# Patient Record
Sex: FEMALE | Race: WHITE | HISPANIC OR LATINO | URBAN - METROPOLITAN AREA
[De-identification: names, ages, dates, MRNs, and addresses within clinical notes are randomized per-mention and may not be internally consistent; named-entity substitution may affect disease eponyms.]

---

## 2024-11-01 ENCOUNTER — INPATIENT (INPATIENT)
Facility: HOSPITAL | Age: 64
LOS: 4 days | Discharge: HOME | DRG: 65 | End: 2024-11-06
Attending: STUDENT IN AN ORGANIZED HEALTH CARE EDUCATION/TRAINING PROGRAM | Admitting: STUDENT IN AN ORGANIZED HEALTH CARE EDUCATION/TRAINING PROGRAM
Payer: MEDICARE

## 2024-11-01 ENCOUNTER — NON-APPOINTMENT (OUTPATIENT)
Age: 64
End: 2024-11-01

## 2024-11-01 ENCOUNTER — EMERGENCY (EMERGENCY)
Facility: HOSPITAL | Age: 64
LOS: 1 days | Discharge: ANOTHER HOSPITAL | End: 2024-11-01
Attending: EMERGENCY MEDICINE | Admitting: EMERGENCY MEDICINE
Payer: MEDICARE

## 2024-11-01 VITALS
RESPIRATION RATE: 20 BRPM | HEIGHT: 57 IN | SYSTOLIC BLOOD PRESSURE: 150 MMHG | OXYGEN SATURATION: 100 % | DIASTOLIC BLOOD PRESSURE: 75 MMHG | WEIGHT: 158.07 LBS | HEART RATE: 73 BPM | TEMPERATURE: 99 F

## 2024-11-01 VITALS
TEMPERATURE: 98 F | HEART RATE: 83 BPM | SYSTOLIC BLOOD PRESSURE: 104 MMHG | OXYGEN SATURATION: 98 % | DIASTOLIC BLOOD PRESSURE: 60 MMHG | RESPIRATION RATE: 20 BRPM

## 2024-11-01 VITALS
HEART RATE: 75 BPM | OXYGEN SATURATION: 100 % | SYSTOLIC BLOOD PRESSURE: 154 MMHG | DIASTOLIC BLOOD PRESSURE: 75 MMHG | RESPIRATION RATE: 20 BRPM

## 2024-11-01 DIAGNOSIS — I60.9 NONTRAUMATIC SUBARACHNOID HEMORRHAGE, UNSPECIFIED: ICD-10-CM

## 2024-11-01 LAB
ALBUMIN SERPL ELPH-MCNC: 4.1 G/DL — SIGNIFICANT CHANGE UP (ref 3.3–5)
ALBUMIN SERPL ELPH-MCNC: 4.3 G/DL — SIGNIFICANT CHANGE UP (ref 3.3–5.2)
ALP SERPL-CCNC: 93 U/L — SIGNIFICANT CHANGE UP (ref 40–120)
ALP SERPL-CCNC: 95 U/L — SIGNIFICANT CHANGE UP (ref 40–120)
ALT FLD-CCNC: 18 U/L — SIGNIFICANT CHANGE UP
ALT FLD-CCNC: 31 U/L — SIGNIFICANT CHANGE UP (ref 12–78)
ANION GAP SERPL CALC-SCNC: 10 MMOL/L — SIGNIFICANT CHANGE UP (ref 5–17)
ANION GAP SERPL CALC-SCNC: 14 MMOL/L — SIGNIFICANT CHANGE UP (ref 5–17)
APPEARANCE UR: CLEAR — SIGNIFICANT CHANGE UP
APTT BLD: 32.4 SEC — SIGNIFICANT CHANGE UP (ref 24.5–35.6)
APTT BLD: 33.5 SEC — SIGNIFICANT CHANGE UP (ref 24.5–35.6)
AST SERPL-CCNC: 21 U/L — SIGNIFICANT CHANGE UP
AST SERPL-CCNC: 25 U/L — SIGNIFICANT CHANGE UP (ref 15–37)
BASOPHILS # BLD AUTO: 0.02 K/UL — SIGNIFICANT CHANGE UP (ref 0–0.2)
BASOPHILS NFR BLD AUTO: 0.2 % — SIGNIFICANT CHANGE UP (ref 0–2)
BILIRUB SERPL-MCNC: 0.6 MG/DL — SIGNIFICANT CHANGE UP (ref 0.4–2)
BILIRUB SERPL-MCNC: 0.7 MG/DL — SIGNIFICANT CHANGE UP (ref 0.2–1.2)
BILIRUB UR-MCNC: NEGATIVE — SIGNIFICANT CHANGE UP
BLD GP AB SCN SERPL QL: SIGNIFICANT CHANGE UP
BUN SERPL-MCNC: 11 MG/DL — SIGNIFICANT CHANGE UP (ref 7–23)
BUN SERPL-MCNC: 8.3 MG/DL — SIGNIFICANT CHANGE UP (ref 8–20)
CALCIUM SERPL-MCNC: 9 MG/DL — SIGNIFICANT CHANGE UP (ref 8.4–10.5)
CALCIUM SERPL-MCNC: 9.1 MG/DL — SIGNIFICANT CHANGE UP (ref 8.5–10.1)
CHLORIDE SERPL-SCNC: 102 MMOL/L — SIGNIFICANT CHANGE UP (ref 96–108)
CHLORIDE SERPL-SCNC: 103 MMOL/L — SIGNIFICANT CHANGE UP (ref 96–108)
CO2 SERPL-SCNC: 21 MMOL/L — LOW (ref 22–29)
CO2 SERPL-SCNC: 23 MMOL/L — SIGNIFICANT CHANGE UP (ref 22–31)
COLOR SPEC: YELLOW — SIGNIFICANT CHANGE UP
CREAT SERPL-MCNC: 0.34 MG/DL — LOW (ref 0.5–1.3)
CREAT SERPL-MCNC: 0.5 MG/DL — SIGNIFICANT CHANGE UP (ref 0.5–1.3)
DIFF PNL FLD: NEGATIVE — SIGNIFICANT CHANGE UP
EGFR: 104 ML/MIN/1.73M2 — SIGNIFICANT CHANGE UP
EGFR: 114 ML/MIN/1.73M2 — SIGNIFICANT CHANGE UP
EOSINOPHIL # BLD AUTO: 0 K/UL — SIGNIFICANT CHANGE UP (ref 0–0.5)
EOSINOPHIL NFR BLD AUTO: 0 % — SIGNIFICANT CHANGE UP (ref 0–6)
FLUAV AG NPH QL: SIGNIFICANT CHANGE UP
FLUBV AG NPH QL: SIGNIFICANT CHANGE UP
GLUCOSE BLDC GLUCOMTR-MCNC: 131 MG/DL — HIGH (ref 70–99)
GLUCOSE SERPL-MCNC: 118 MG/DL — HIGH (ref 70–99)
GLUCOSE SERPL-MCNC: 136 MG/DL — HIGH (ref 70–99)
GLUCOSE UR QL: NEGATIVE MG/DL — SIGNIFICANT CHANGE UP
HCT VFR BLD CALC: 36.7 % — SIGNIFICANT CHANGE UP (ref 34.5–45)
HCT VFR BLD CALC: 37.6 % — SIGNIFICANT CHANGE UP (ref 34.5–45)
HGB BLD-MCNC: 12.5 G/DL — SIGNIFICANT CHANGE UP (ref 11.5–15.5)
HGB BLD-MCNC: 13.1 G/DL — SIGNIFICANT CHANGE UP (ref 11.5–15.5)
IMM GRANULOCYTES NFR BLD AUTO: 0.5 % — SIGNIFICANT CHANGE UP (ref 0–0.9)
INR BLD: 0.98 RATIO — SIGNIFICANT CHANGE UP (ref 0.85–1.16)
INR BLD: 0.98 RATIO — SIGNIFICANT CHANGE UP (ref 0.85–1.16)
KETONES UR-MCNC: NEGATIVE MG/DL — SIGNIFICANT CHANGE UP
LACTATE SERPL-SCNC: 1.7 MMOL/L — SIGNIFICANT CHANGE UP (ref 0.7–2)
LEUKOCYTE ESTERASE UR-ACNC: NEGATIVE — SIGNIFICANT CHANGE UP
LYMPHOCYTES # BLD AUTO: 2.12 K/UL — SIGNIFICANT CHANGE UP (ref 1–3.3)
LYMPHOCYTES # BLD AUTO: 26.3 % — SIGNIFICANT CHANGE UP (ref 13–44)
MAGNESIUM SERPL-MCNC: 2.5 MG/DL — SIGNIFICANT CHANGE UP (ref 1.8–2.6)
MCHC RBC-ENTMCNC: 29.4 PG — SIGNIFICANT CHANGE UP (ref 27–34)
MCHC RBC-ENTMCNC: 30 PG — SIGNIFICANT CHANGE UP (ref 27–34)
MCHC RBC-ENTMCNC: 34.1 G/DL — SIGNIFICANT CHANGE UP (ref 32–36)
MCHC RBC-ENTMCNC: 34.8 G/DL — SIGNIFICANT CHANGE UP (ref 32–36)
MCV RBC AUTO: 86.2 FL — SIGNIFICANT CHANGE UP (ref 80–100)
MCV RBC AUTO: 86.4 FL — SIGNIFICANT CHANGE UP (ref 80–100)
MONOCYTES # BLD AUTO: 0.24 K/UL — SIGNIFICANT CHANGE UP (ref 0–0.9)
MONOCYTES NFR BLD AUTO: 3 % — SIGNIFICANT CHANGE UP (ref 2–14)
NEUTROPHILS # BLD AUTO: 5.65 K/UL — SIGNIFICANT CHANGE UP (ref 1.8–7.4)
NEUTROPHILS NFR BLD AUTO: 70 % — SIGNIFICANT CHANGE UP (ref 43–77)
NITRITE UR-MCNC: NEGATIVE — SIGNIFICANT CHANGE UP
NRBC # BLD: 0 /100 WBCS — SIGNIFICANT CHANGE UP (ref 0–0)
PH UR: 7 — SIGNIFICANT CHANGE UP (ref 5–8)
PHOSPHATE SERPL-MCNC: 2.9 MG/DL — SIGNIFICANT CHANGE UP (ref 2.4–4.7)
PLATELET # BLD AUTO: 314 K/UL — SIGNIFICANT CHANGE UP (ref 150–400)
PLATELET # BLD AUTO: 329 K/UL — SIGNIFICANT CHANGE UP (ref 150–400)
POTASSIUM SERPL-MCNC: 3.4 MMOL/L — LOW (ref 3.5–5.3)
POTASSIUM SERPL-MCNC: 4.1 MMOL/L — SIGNIFICANT CHANGE UP (ref 3.5–5.3)
POTASSIUM SERPL-SCNC: 3.4 MMOL/L — LOW (ref 3.5–5.3)
POTASSIUM SERPL-SCNC: 4.1 MMOL/L — SIGNIFICANT CHANGE UP (ref 3.5–5.3)
PROT SERPL-MCNC: 8.1 G/DL — SIGNIFICANT CHANGE UP (ref 6.6–8.7)
PROT SERPL-MCNC: 8.8 G/DL — HIGH (ref 6–8.3)
PROT UR-MCNC: NEGATIVE MG/DL — SIGNIFICANT CHANGE UP
PROTHROM AB SERPL-ACNC: 11.4 SEC — SIGNIFICANT CHANGE UP (ref 9.9–13.4)
PROTHROM AB SERPL-ACNC: 11.5 SEC — SIGNIFICANT CHANGE UP (ref 9.9–13.4)
RBC # BLD: 4.25 M/UL — SIGNIFICANT CHANGE UP (ref 3.8–5.2)
RBC # BLD: 4.36 M/UL — SIGNIFICANT CHANGE UP (ref 3.8–5.2)
RBC # FLD: 12.5 % — SIGNIFICANT CHANGE UP (ref 10.3–14.5)
RBC # FLD: 12.5 % — SIGNIFICANT CHANGE UP (ref 10.3–14.5)
RSV RNA NPH QL NAA+NON-PROBE: SIGNIFICANT CHANGE UP
SARS-COV-2 RNA SPEC QL NAA+PROBE: SIGNIFICANT CHANGE UP
SODIUM SERPL-SCNC: 135 MMOL/L — SIGNIFICANT CHANGE UP (ref 135–145)
SODIUM SERPL-SCNC: 138 MMOL/L — SIGNIFICANT CHANGE UP (ref 135–145)
SP GR SPEC: 1.01 — SIGNIFICANT CHANGE UP (ref 1–1.03)
TROPONIN I, HIGH SENSITIVITY RESULT: 5.7 NG/L — SIGNIFICANT CHANGE UP
UROBILINOGEN FLD QL: 0.2 MG/DL — SIGNIFICANT CHANGE UP (ref 0.2–1)
WBC # BLD: 6.59 K/UL — SIGNIFICANT CHANGE UP (ref 3.8–10.5)
WBC # BLD: 8.07 K/UL — SIGNIFICANT CHANGE UP (ref 3.8–10.5)
WBC # FLD AUTO: 6.59 K/UL — SIGNIFICANT CHANGE UP (ref 3.8–10.5)
WBC # FLD AUTO: 8.07 K/UL — SIGNIFICANT CHANGE UP (ref 3.8–10.5)

## 2024-11-01 PROCEDURE — 70496 CT ANGIOGRAPHY HEAD: CPT | Mod: 26

## 2024-11-01 PROCEDURE — 99291 CRITICAL CARE FIRST HOUR: CPT | Mod: FS

## 2024-11-01 PROCEDURE — 71046 X-RAY EXAM CHEST 2 VIEWS: CPT | Mod: 26

## 2024-11-01 PROCEDURE — 74174 CTA ABD&PLVS W/CONTRAST: CPT | Mod: 26,MC

## 2024-11-01 PROCEDURE — 70470 CT HEAD/BRAIN W/O & W/DYE: CPT | Mod: 26,MC

## 2024-11-01 PROCEDURE — 93010 ELECTROCARDIOGRAM REPORT: CPT

## 2024-11-01 PROCEDURE — 70498 CT ANGIOGRAPHY NECK: CPT | Mod: 26

## 2024-11-01 PROCEDURE — 71275 CT ANGIOGRAPHY CHEST: CPT | Mod: 26,MC

## 2024-11-01 PROCEDURE — 99291 CRITICAL CARE FIRST HOUR: CPT

## 2024-11-01 PROCEDURE — 72125 CT NECK SPINE W/O DYE: CPT | Mod: 26,MC

## 2024-11-01 PROCEDURE — 99285 EMERGENCY DEPT VISIT HI MDM: CPT

## 2024-11-01 PROCEDURE — 93970 EXTREMITY STUDY: CPT | Mod: 26

## 2024-11-01 RX ORDER — SENNA 187 MG
2 TABLET ORAL AT BEDTIME
Refills: 0 | Status: DISCONTINUED | OUTPATIENT
Start: 2024-11-01 | End: 2024-11-06

## 2024-11-01 RX ORDER — LEVETIRACETAM 500 MG/1
500 TABLET, FILM COATED ORAL EVERY 12 HOURS
Refills: 0 | Status: DISCONTINUED | OUTPATIENT
Start: 2024-11-01 | End: 2024-11-02

## 2024-11-01 RX ORDER — HYDRALAZINE HYDROCHLORIDE 50 MG/1
10 TABLET, FILM COATED ORAL
Refills: 0 | Status: DISCONTINUED | OUTPATIENT
Start: 2024-11-01 | End: 2024-11-02

## 2024-11-01 RX ORDER — SODIUM CHLORIDE 9 MG/ML
2200 INJECTION, SOLUTION INTRAMUSCULAR; INTRAVENOUS; SUBCUTANEOUS ONCE
Refills: 0 | Status: COMPLETED | OUTPATIENT
Start: 2024-11-01 | End: 2024-11-01

## 2024-11-01 RX ORDER — LEVETIRACETAM 500 MG/1
500 TABLET, FILM COATED ORAL ONCE
Refills: 0 | Status: DISCONTINUED | OUTPATIENT
Start: 2024-11-01 | End: 2024-11-01

## 2024-11-01 RX ORDER — NIMODIPINE 30 MG/1
60 CAPSULE, LIQUID FILLED ORAL EVERY 4 HOURS
Refills: 0 | Status: DISCONTINUED | OUTPATIENT
Start: 2024-11-01 | End: 2024-11-06

## 2024-11-01 RX ORDER — LABETALOL HCL 200 MG
10 TABLET ORAL
Refills: 0 | Status: DISCONTINUED | OUTPATIENT
Start: 2024-11-01 | End: 2024-11-02

## 2024-11-01 RX ORDER — METOCLOPRAMIDE HCL 10 MG
10 TABLET ORAL ONCE
Refills: 0 | Status: COMPLETED | OUTPATIENT
Start: 2024-11-01 | End: 2024-11-01

## 2024-11-01 RX ORDER — DEXAMETHASONE 1.5 MG 1.5 MG/1
6 TABLET ORAL ONCE
Refills: 0 | Status: COMPLETED | OUTPATIENT
Start: 2024-11-01 | End: 2024-11-01

## 2024-11-01 RX ORDER — ACETAMINOPHEN 500 MG
1000 TABLET ORAL ONCE
Refills: 0 | Status: COMPLETED | OUTPATIENT
Start: 2024-11-01 | End: 2024-11-01

## 2024-11-01 RX ORDER — CHLORHEXIDINE GLUCONATE 40 MG/ML
1 SOLUTION TOPICAL
Refills: 0 | Status: DISCONTINUED | OUTPATIENT
Start: 2024-11-01 | End: 2024-11-06

## 2024-11-01 RX ORDER — INSULIN LISPRO 100/ML
VIAL (ML) SUBCUTANEOUS EVERY 6 HOURS
Refills: 0 | Status: DISCONTINUED | OUTPATIENT
Start: 2024-11-01 | End: 2024-11-02

## 2024-11-01 RX ORDER — NICARDIPINE HCL 30 MG
5 CAPSULE, EXTENDED RELEASE ORAL
Qty: 40 | Refills: 0 | Status: DISCONTINUED | OUTPATIENT
Start: 2024-11-01 | End: 2024-11-04

## 2024-11-01 RX ORDER — SODIUM CHLORIDE 9 MG/ML
1000 INJECTION, SOLUTION INTRAMUSCULAR; INTRAVENOUS; SUBCUTANEOUS
Refills: 0 | Status: DISCONTINUED | OUTPATIENT
Start: 2024-11-01 | End: 2024-11-02

## 2024-11-01 RX ORDER — POLYETHYLENE GLYCOL 3350 17 G/17G
17 POWDER, FOR SOLUTION ORAL DAILY
Refills: 0 | Status: DISCONTINUED | OUTPATIENT
Start: 2024-11-01 | End: 2024-11-06

## 2024-11-01 RX ADMIN — Medication 2 TABLET(S): at 22:00

## 2024-11-01 RX ADMIN — Medication 25 MG/HR: at 15:18

## 2024-11-01 RX ADMIN — Medication 400 MILLIGRAM(S): at 13:29

## 2024-11-01 RX ADMIN — LEVETIRACETAM 500 MILLIGRAM(S): 500 TABLET, FILM COATED ORAL at 19:54

## 2024-11-01 RX ADMIN — Medication 10 MILLIGRAM(S): at 13:30

## 2024-11-01 RX ADMIN — NIMODIPINE 60 MILLIGRAM(S): 30 CAPSULE, LIQUID FILLED ORAL at 22:01

## 2024-11-01 RX ADMIN — LEVETIRACETAM 500 MILLIGRAM(S): 500 TABLET, FILM COATED ORAL at 15:19

## 2024-11-01 RX ADMIN — DEXAMETHASONE 1.5 MG 6 MILLIGRAM(S): 1.5 TABLET ORAL at 13:29

## 2024-11-01 RX ADMIN — SODIUM CHLORIDE 2200 MILLILITER(S): 9 INJECTION, SOLUTION INTRAMUSCULAR; INTRAVENOUS; SUBCUTANEOUS at 13:29

## 2024-11-01 RX ADMIN — Medication 1000 MILLIGRAM(S): at 14:30

## 2024-11-01 RX ADMIN — NIMODIPINE 60 MILLIGRAM(S): 30 CAPSULE, LIQUID FILLED ORAL at 19:55

## 2024-11-01 RX ADMIN — SODIUM CHLORIDE 75 MILLILITER(S): 9 INJECTION, SOLUTION INTRAMUSCULAR; INTRAVENOUS; SUBCUTANEOUS at 16:48

## 2024-11-01 NOTE — ED PROVIDER NOTE - MUSCULOSKELETAL, MLM
Spine appears normal, range of motion is limited at the neck on turning causing pain in back of head and along paracervical spine musculature no bony pain no meningismus , no muscle or joint tenderness

## 2024-11-01 NOTE — ED PROVIDER NOTE - CLINICAL SUMMARY MEDICAL DECISION MAKING FREE TEXT BOX
Patient is a previously healthy 63-year-old female who presents emergency room with head pain nausea vomiting and abdominal discomfort.  Says the pain gets worse when she stood ends up.  Better when she sits down.  At this point she has no further abdominal discomfort which she also endorsed last night.  Plan of care includes rule out intracerebral hemorrhage rule out aortic dissection rule out herniated disc disease of the cervical spine, rule out ACS rule out infection of the abdomen and pelvis.  Will obtain laboratory studies including type and screen and coagulation profile, antiemetics IV fluids Tylenol for pain and steroids for the migraine headache.  EKG, troponin, urinalysis and COVID testing.  This patient has multiple symptoms for multiple organ systems.  Will try to exclude all the emergency is relevant to the confluence of complaints.

## 2024-11-01 NOTE — ED PROVIDER NOTE - CONSTITUTIONAL, MLM
normal... Well appearing, awake, alert, oriented to person, place, time/situation and in no apparent distress. speech is clear concise and pt able to speak lengthy sentences with  needing to stop her for a pause to translate. no

## 2024-11-01 NOTE — ED PROVIDER NOTE - OBJECTIVE STATEMENT
hx supplemented by  via language line and pt's daughter at bedside  Patient is a 63-year-old female who denies any significant medical surgical history.  She states that last night she began to have stomach pain and headaches.  She was giving her self and abdominal massage and this caused the headache pain to get worse.  She got up to go to the bathroom, became sweaty nauseous and vomited.  Every time she stood up the headaches got worse.  She had 5 episodes of emesis including at 5 AM this morning.  She has no hematemesis or hematochezia.  She has no diarrhea.  She has no recent trauma or travel.  She is not a smoker or drinker.  She has no history of headaches.  She has no past surgical history.  She has no ill contacts.  She has no primary care physician.  She has no drug allergies.  Patient is not a smoker.  No drug user.  She denies domestic violence.  She denies any urinary symptoms.  She currently has no abdominal pain.  States her pain at its worst was a 10 out of 10 when standing and walking, but now while seated is only at 1-2 out of 10 in quality.  The pain started at the top of her head, and when she applied pressure to the back of her neck the pain got worse.

## 2024-11-01 NOTE — H&P ADULT - NSHPLABSRESULTS_GEN_ALL_CORE
11-01    138  |  103  |  8.3  ----------------------------<  136[H]  3.4[L]   |  21.0[L]  |  0.34[L]    Ca    9.0      01 Nov 2024 16:40  Phos  2.9     11-01  Mg     2.5     11-01    TPro  8.1  /  Alb  4.3  /  TBili  0.6  /  DBili  x   /  AST  21  /  ALT  18  /  AlkPhos  95  11-01        ACC: [id number] EXAM: CT BRAIN WAW IC ORDERED BY: SLOANE MONTOYA  PROCEDURE DATE: 11/01/2024     IMPRESSION:  CT head:  -Acute subarachnoid hemorrhage in the prepontine cistern and extending into the left perimesencephalic and ambient cisterns. Recommend CTA to evaluate for aneurysm or vascular malformation. Recommend neurointerventional evaluation.

## 2024-11-01 NOTE — CONSULT NOTE ADULT - PROBLEM SELECTOR RECOMMENDATION 9
case d/w Dr. Pereyra and images reviewed   no immediate NSx intervention indicated at this time  CTA H/N ASAP   neuro-checks q1 HR and NSICU admit  NPO w IVF until CTA reviewed   HOB > 30 degrees  hold all AC/AP/ ASA or chemical DVT PPx  OOB w assist/ fall precaution   SBP <160mmHg if nonaneurysmal heme per CTA if aneurysmal heme SBP<140mmHg or as per NSICU   PT/OT evals in AM

## 2024-11-01 NOTE — ED PROVIDER NOTE - NIH STROKE SCALE: 5B. MOTOR ARM, RIGHT, QM
Per Dr. See & MIKE Davis, pts labs have already significantly improved; f/u plan is for pt to see PCP in 3 mos w/ CBC. Pt does not need to f/u w/ our office at this time, unless something changes.    Call to Trish, mother, reviewed above & plan, verified PCP is Dr. Stauffer, advised I would update Dr. Stauffer's staff w/ plan. Ruma is in agreement. Encouraged them to call Dr. Stauffer's Office to schedule & she agreed. Encouraged them to update our staff w/ changes, questions, or concerns & she agreed.    Faxed update to Dr. Stauffer's Office at [phone].  
(0) No drift; limb holds 90 (or 45) degrees for full 10 secs

## 2024-11-01 NOTE — ED ADULT NURSE NOTE - CHIEF COMPLAINT QUOTE
pt transfer from [place], pt brought to ED in [place] due to nausea and vomiting starting last night. pt found to have subarachnoid hemorrahge. pt on Nicardipine drip

## 2024-11-01 NOTE — ED PROVIDER NOTE - PROGRESS NOTE DETAILS
Results reviewed with daughter.  She is aware the tests are negative.  Patient in CAT scan at this time.  She is doing well. radiologist called via teams to notify me pt has brainstem bleeding needs angiography and neurosurgery  will arrange for transfer and consult dw pt and dtr with   aware of brain bleed need for transfer   aware of need for transfer  dr taylor keep sbp under  140 keppra nicardipine to keep sbp below 140
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
## 2024-11-01 NOTE — ED PROVIDER NOTE - CRITICAL CARE ATTENDING CONTRIBUTION TO CARE
Patient was critically ill with a high probability of imminent or life threatening deterioration.  I have performed direct patient care (not related to procedure), additional history taking, interpretation of diagnostic studies, documentation, consultation with other physicians, telephone consultation with the patient's family.   I have personally and independently provided the amount of critical care time documented below excluding time spent on separate procedures.  critical care saida:30 mins

## 2024-11-01 NOTE — H&P ADULT - HISTORY OF PRESENT ILLNESS
63 year old female with no past medical history who presents with headache, nausea, and vomiting which began last night. Patient states that her symptoms began with abdominal pain for which she performed abdominal massage and she began experiencing strong headaches along with nausea and vomiting shortly after. Reports that she took over the counter Magnesium for her symptoms and went to bed. This morning, patient states that she continued to experience headache which worsened with standing and walking. Denies any current nausea, vomiting, or abdominal pain. Denies any episodes of vomiting today. Denies any changes to vision, difficulties speaking, or difficulty with moving the extremities. Notes that her headache improves with laying in bed.

## 2024-11-01 NOTE — H&P ADULT - NSHPPHYSICALEXAM_GEN_ALL_CORE
GENERAL: NAD, well-groomed  HEAD:  Atraumatic, normocephalic  EYES: Conjunctiva and sclera clear  HERIBERTO COMA SCORE: E-4 V-5 M-6 = 15  MENTAL STATUS: AAO x3; Awake, opens eyes spontaneously. Appropriately conversant without aphasia. Following simple commands.  CRANIAL NERVES: Visual acuity normal for age, visual fields full to confrontation, PERRL. EOMI without nystagmus. Left facial droop noted. Tongue midline. Hearing grossly intact. Speech clear.    REFLEXES: PERRL.   MOTOR: strength 5/5 b/l upper and lower extremities  Uppers     Delt (C5/6)     Bicep (C5/6)     Tricep (C7)     HG (C8/T1)  R                     5/5                 5/5                         5/5                           5/5                    L                      5/5                 5/5                         5/5                           5/5                    Lowers     HF(L1/L2)   KE (L3)     DF (L4)     PF (S1)      R                     5/5              5/5           5/5           5/5            L                     5/5               5/5          5/5            5/5            SENSATION: grossly intact to light touch all extremities  COORDINATION: No upper extremity dysmetria  CHEST/LUNG: No labored breathing or accessory muscle use    HEART: Sinus rhythm  ABDOMEN: Soft, nondistended;  LYMPH: No lymphadenopathy noted  SKIN: Warm, dry

## 2024-11-01 NOTE — ED ADULT TRIAGE NOTE - NS ED TRIAGE AVPU SCALE
Alert-The patient is alert, awake and responds to voice. The patient is oriented to time, place, and person. The triage nurse is able to obtain subjective information.
21

## 2024-11-01 NOTE — ED ADULT NURSE REASSESSMENT NOTE - NS ED NURSE REASSESS COMMENT FT1
received patient at CT scan, patient in no distress, minimal headache, tolerable, awaiting return to room for meds and updated vitals LS clear, respirations even and nonlabored,

## 2024-11-01 NOTE — ED ADULT TRIAGE NOTE - CHIEF COMPLAINT QUOTE
pt was wheeled into triage C/O nausea and vomiting with headache that started last night. denies past medical hx. BEFAST negative.

## 2024-11-01 NOTE — ED ADULT NURSE NOTE - OBJECTIVE STATEMENT
Pt presents to the ED c/o headache. Reports n/v and abdominal pain yesterday, relieved at this time. Denies fevers/chills. No neuro deficit observed, no facial asymmetry, equal  strength bilaterally, no arm/leg drift, positive strength to all four extremities, and positive sensation. IV established in left arm with a 20G, labs drawn and sent, call bell in reach, warm blanket provided, bed in lowest position, side rails up x2, MD evaluation in progress. Pt is well appearing, in no acute distress at this time.

## 2024-11-01 NOTE — ED PROVIDER NOTE - NIH STROKE SCALE: 9. BEST LANGUAGE, QM
(0) No aphasia; normal Mirvaso Counseling: Mirvaso is a topical medication which can decrease superficial blood flow where applied. Side effects are uncommon and include stinging, redness and allergic reactions.

## 2024-11-01 NOTE — ED ADULT TRIAGE NOTE - CHIEF COMPLAINT QUOTE
pt transfer from [place], pt brought to ED in [place] due to nausea and vomiting starting last night. pt found to have subarachnoid hemorrahge. pt on Nicardipine drip

## 2024-11-01 NOTE — H&P ADULT - NSHPSOCIALHISTORY_GEN_ALL_CORE
Patient states that she lives between [place] and [place] during the year. 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 Patient states that she does not follow with primary care doctors and does not desire to take medication at home and prefers supplements and natural treatments. Patient is .

## 2024-11-01 NOTE — H&P ADULT - ASSESSMENT
63 year old female with no past medical history who presents with headache, nausea, and vomiting. CT head reveals acute subarachnoid hemorrhage in the prepontine cistern.    Plan    Neuro  Admit to Neuro ICU  Q1 neuro checks   Repeat CT head  CTA  Keppra 500mg IV push every 12 hours  Nimodipine 60mg every 4 hours  Neurosurgical plan pending imaging.    CV  SBP goal less than 140  PRN Labetalol  PRN Hydralazine   Follow lipid panel with AM labs    Respiratory  RA    GI  NPO  Bowel Regimen: Senna and Miralax      Sodium chloride 0.9% at 75 mL/hr  Monitor input and output, euvolemic goal    Heme  DVT prophylaxis SCDs  Hold chemoprophylaxis secondary to SAH    Endo  Follow A1c and TSH with morning labs
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
## 2024-11-02 ENCOUNTER — APPOINTMENT (OUTPATIENT)
Dept: NEUROLOGY | Facility: HOSPITAL | Age: 64
End: 2024-11-02

## 2024-11-02 ENCOUNTER — RESULT REVIEW (OUTPATIENT)
Age: 64
End: 2024-11-02

## 2024-11-02 LAB
A1C WITH ESTIMATED AVERAGE GLUCOSE RESULT: 5.7 % — HIGH (ref 4–5.6)
ANION GAP SERPL CALC-SCNC: 13 MMOL/L — SIGNIFICANT CHANGE UP (ref 5–17)
BLD GP AB SCN SERPL QL: SIGNIFICANT CHANGE UP
BUN SERPL-MCNC: 12.1 MG/DL — SIGNIFICANT CHANGE UP (ref 8–20)
CALCIUM SERPL-MCNC: 8.4 MG/DL — SIGNIFICANT CHANGE UP (ref 8.4–10.5)
CHLORIDE SERPL-SCNC: 106 MMOL/L — SIGNIFICANT CHANGE UP (ref 96–108)
CHOLEST SERPL-MCNC: 198 MG/DL — SIGNIFICANT CHANGE UP
CO2 SERPL-SCNC: 19 MMOL/L — LOW (ref 22–29)
CREAT SERPL-MCNC: 0.26 MG/DL — LOW (ref 0.5–1.3)
EGFR: 122 ML/MIN/1.73M2 — SIGNIFICANT CHANGE UP
ESTIMATED AVERAGE GLUCOSE: 117 MG/DL — HIGH (ref 68–114)
GLUCOSE BLDC GLUCOMTR-MCNC: 117 MG/DL — HIGH (ref 70–99)
GLUCOSE BLDC GLUCOMTR-MCNC: 84 MG/DL — SIGNIFICANT CHANGE UP (ref 70–99)
GLUCOSE SERPL-MCNC: 110 MG/DL — HIGH (ref 70–99)
HCT VFR BLD CALC: 32.1 % — LOW (ref 34.5–45)
HDLC SERPL-MCNC: 50 MG/DL — LOW
HGB BLD-MCNC: 11.2 G/DL — LOW (ref 11.5–15.5)
LIPID PNL WITH DIRECT LDL SERPL: 130 MG/DL — HIGH
MAGNESIUM SERPL-MCNC: 2.2 MG/DL — SIGNIFICANT CHANGE UP (ref 1.6–2.6)
MCHC RBC-ENTMCNC: 30 PG — SIGNIFICANT CHANGE UP (ref 27–34)
MCHC RBC-ENTMCNC: 34.9 G/DL — SIGNIFICANT CHANGE UP (ref 32–36)
MCV RBC AUTO: 86.1 FL — SIGNIFICANT CHANGE UP (ref 80–100)
MRSA PCR RESULT.: SIGNIFICANT CHANGE UP
NON HDL CHOLESTEROL: 148 MG/DL — HIGH
PHOSPHATE SERPL-MCNC: 3.7 MG/DL — SIGNIFICANT CHANGE UP (ref 2.4–4.7)
PLATELET # BLD AUTO: 275 K/UL — SIGNIFICANT CHANGE UP (ref 150–400)
POTASSIUM SERPL-MCNC: 3.4 MMOL/L — LOW (ref 3.5–5.3)
POTASSIUM SERPL-SCNC: 3.4 MMOL/L — LOW (ref 3.5–5.3)
RBC # BLD: 3.73 M/UL — LOW (ref 3.8–5.2)
RBC # FLD: 12.6 % — SIGNIFICANT CHANGE UP (ref 10.3–14.5)
S AUREUS DNA NOSE QL NAA+PROBE: SIGNIFICANT CHANGE UP
SODIUM SERPL-SCNC: 138 MMOL/L — SIGNIFICANT CHANGE UP (ref 135–145)
TRIGL SERPL-MCNC: 89 MG/DL — SIGNIFICANT CHANGE UP
TSH SERPL-MCNC: 0.18 UIU/ML — LOW (ref 0.27–4.2)
WBC # BLD: 8.03 K/UL — SIGNIFICANT CHANGE UP (ref 3.8–10.5)
WBC # FLD AUTO: 8.03 K/UL — SIGNIFICANT CHANGE UP (ref 3.8–10.5)

## 2024-11-02 PROCEDURE — 99232 SBSQ HOSP IP/OBS MODERATE 35: CPT

## 2024-11-02 PROCEDURE — 76377 3D RENDER W/INTRP POSTPROCES: CPT | Mod: 26

## 2024-11-02 PROCEDURE — 36227 PLACE CATH XTRNL CAROTID: CPT | Mod: 50

## 2024-11-02 PROCEDURE — 36224 PLACE CATH CAROTD ART: CPT | Mod: 50

## 2024-11-02 PROCEDURE — 93306 TTE W/DOPPLER COMPLETE: CPT | Mod: 26

## 2024-11-02 PROCEDURE — 36226 PLACE CATH VERTEBRAL ART: CPT | Mod: 50

## 2024-11-02 PROCEDURE — 99291 CRITICAL CARE FIRST HOUR: CPT

## 2024-11-02 RX ORDER — HYDRALAZINE HYDROCHLORIDE 50 MG/1
10 TABLET, FILM COATED ORAL
Refills: 0 | Status: DISCONTINUED | OUTPATIENT
Start: 2024-11-02 | End: 2024-11-06

## 2024-11-02 RX ORDER — LABETALOL HCL 200 MG
10 TABLET ORAL
Refills: 0 | Status: DISCONTINUED | OUTPATIENT
Start: 2024-11-02 | End: 2024-11-06

## 2024-11-02 RX ORDER — CYCLOBENZAPRINE HYDROCHLORIDE 30 MG/1
5 CAPSULE, EXTENDED RELEASE ORAL THREE TIMES A DAY
Refills: 0 | Status: DISCONTINUED | OUTPATIENT
Start: 2024-11-02 | End: 2024-11-02

## 2024-11-02 RX ORDER — MAGNESIUM SULFATE IN 0.9% NACL 2 G/50 ML
2 INTRAVENOUS SOLUTION, PIGGYBACK (ML) INTRAVENOUS ONCE
Refills: 0 | Status: COMPLETED | OUTPATIENT
Start: 2024-11-02 | End: 2024-11-02

## 2024-11-02 RX ORDER — SODIUM CHLORIDE 9 MG/ML
250 INJECTION, SOLUTION INTRAMUSCULAR; INTRAVENOUS; SUBCUTANEOUS ONCE
Refills: 0 | Status: COMPLETED | OUTPATIENT
Start: 2024-11-02 | End: 2024-11-02

## 2024-11-02 RX ORDER — POTASSIUM CHLORIDE 10 MEQ
10 TABLET, EXTENDED RELEASE ORAL
Refills: 0 | Status: COMPLETED | OUTPATIENT
Start: 2024-11-02 | End: 2024-11-02

## 2024-11-02 RX ORDER — SODIUM CHLORIDE 9 MG/ML
500 INJECTION, SOLUTION INTRAMUSCULAR; INTRAVENOUS; SUBCUTANEOUS ONCE
Refills: 0 | Status: COMPLETED | OUTPATIENT
Start: 2024-11-02 | End: 2024-11-02

## 2024-11-02 RX ORDER — ENOXAPARIN SODIUM 40MG/0.4ML
40 SYRINGE (ML) SUBCUTANEOUS
Refills: 0 | Status: DISCONTINUED | OUTPATIENT
Start: 2024-11-02 | End: 2024-11-06

## 2024-11-02 RX ORDER — ACETAMINOPHEN 500 MG
1000 TABLET ORAL ONCE
Refills: 0 | Status: COMPLETED | OUTPATIENT
Start: 2024-11-02 | End: 2024-11-02

## 2024-11-02 RX ORDER — CYCLOBENZAPRINE HYDROCHLORIDE 30 MG/1
5 CAPSULE, EXTENDED RELEASE ORAL THREE TIMES A DAY
Refills: 0 | Status: DISCONTINUED | OUTPATIENT
Start: 2024-11-02 | End: 2024-11-06

## 2024-11-02 RX ADMIN — NIMODIPINE 60 MILLIGRAM(S): 30 CAPSULE, LIQUID FILLED ORAL at 22:06

## 2024-11-02 RX ADMIN — SODIUM CHLORIDE 250 MILLILITER(S): 9 INJECTION, SOLUTION INTRAMUSCULAR; INTRAVENOUS; SUBCUTANEOUS at 22:06

## 2024-11-02 RX ADMIN — NIMODIPINE 60 MILLIGRAM(S): 30 CAPSULE, LIQUID FILLED ORAL at 14:58

## 2024-11-02 RX ADMIN — HYDRALAZINE HYDROCHLORIDE 10 MILLIGRAM(S): 50 TABLET, FILM COATED ORAL at 07:40

## 2024-11-02 RX ADMIN — CYCLOBENZAPRINE HYDROCHLORIDE 5 MILLIGRAM(S): 30 CAPSULE, EXTENDED RELEASE ORAL at 22:07

## 2024-11-02 RX ADMIN — LEVETIRACETAM 500 MILLIGRAM(S): 500 TABLET, FILM COATED ORAL at 05:01

## 2024-11-02 RX ADMIN — NIMODIPINE 60 MILLIGRAM(S): 30 CAPSULE, LIQUID FILLED ORAL at 05:01

## 2024-11-02 RX ADMIN — SODIUM CHLORIDE 1000 MILLILITER(S): 9 INJECTION, SOLUTION INTRAMUSCULAR; INTRAVENOUS; SUBCUTANEOUS at 18:46

## 2024-11-02 RX ADMIN — NIMODIPINE 60 MILLIGRAM(S): 30 CAPSULE, LIQUID FILLED ORAL at 18:21

## 2024-11-02 RX ADMIN — NIMODIPINE 60 MILLIGRAM(S): 30 CAPSULE, LIQUID FILLED ORAL at 01:20

## 2024-11-02 RX ADMIN — SODIUM CHLORIDE 750 MILLILITER(S): 9 INJECTION, SOLUTION INTRAMUSCULAR; INTRAVENOUS; SUBCUTANEOUS at 02:22

## 2024-11-02 RX ADMIN — Medication 40 MILLIGRAM(S): at 22:08

## 2024-11-02 RX ADMIN — Medication 100 MILLIEQUIVALENT(S): at 07:31

## 2024-11-02 RX ADMIN — Medication 100 MILLIEQUIVALENT(S): at 05:30

## 2024-11-02 RX ADMIN — CHLORHEXIDINE GLUCONATE 1 APPLICATION(S): 40 SOLUTION TOPICAL at 05:01

## 2024-11-02 RX ADMIN — Medication 400 MILLIGRAM(S): at 16:23

## 2024-11-02 RX ADMIN — Medication 100 MILLIEQUIVALENT(S): at 06:22

## 2024-11-02 RX ADMIN — Medication 150 GRAM(S): at 18:48

## 2024-11-02 RX ADMIN — Medication 1000 MILLIGRAM(S): at 17:00

## 2024-11-02 RX ADMIN — Medication 20 MILLIGRAM(S): at 22:07

## 2024-11-02 RX ADMIN — Medication 2 TABLET(S): at 22:07

## 2024-11-02 NOTE — CHART NOTE - NSCHARTNOTEFT_GEN_A_CORE
Neurointerventional Surgery Post Procedure Note    Procedure: Selective Cerebral Angiography     Pre- Procedure Diagnosis: perimesencephalic SAH   Post- Procedure Diagnosis: normal angiogram    : Dr. Say Shahid MD  Physician Assistant: Tanja Adler  Nurse: Mike Burnett  Anesthesiologist: RAJIV Navas                   Radiology Tech: Pardeep Garrido  Fellow: Martin Amador    Sheath:  4 Citizen of [place] Sheath    I/Os: estimated blood loss less than 10cc,  IV fluids 200cc, Urine output 0cc, Contrast: Omnipaque 240  120 cc    Vitals:  /66  HR 80  Spo2 100 %    Preliminary Report:  Under a 4 Citizen of [place] short sheath via the right groin under MAC sedation via left vertebral artery, left internal carotid artery, left external carotid artery, right vertebral artery, right internal carotid artery, right external carotid artery, a selective cerebral angiography  was performed and reveals normal angiogram ( Official note to follow).    Patient tolerated procedure well.  Patient remains hemodynamically stable, no change in neurological status compared to baseline.  Results were discussed with patient, patient's family and Neurosurgery.  Right groin sheath  was discontinued at 0952. Hemostasis was obtained with approximately 13 minutes of manual compression.     No active bleeding, no hematoma, no ecchymosis.   Quick clot and safeguard balloon dressing applied at 1005  Patient transferred to neuro ICU in stable condition. Neurointerventional Surgery Post Procedure Note    Procedure: Selective Cerebral Angiography     Pre- Procedure Diagnosis: perimesencephalic SAH   Post- Procedure Diagnosis: normal angiogram    : Dr. Say Shahid MD  Physician Assistant: Tanja Adler  Nurse: Mike Burnett  Anesthesiologist: RAJIV Navas                   Radiology Tech: Pardeep Garrido  Fellow: Martin Amador    Sheath:  4 [place] Sheath    I/Os: estimated blood loss less than 10cc,  IV fluids 200cc, Urine output 0cc, Contrast: Omnipaque 240  120 cc    Vitals:  /66  HR 80  Spo2 100 %    Preliminary Report:  Under a 4 [place] short sheath via the right groin under MAC sedation via left vertebral artery, left internal carotid artery, left external carotid artery, right vertebral artery, right internal carotid artery, right external carotid artery, a selective cerebral angiography  was performed and reveals normal angiogram ( Official note to follow).    Patient tolerated procedure well.  Patient remains hemodynamically stable, no change in neurological status compared to baseline.  Results were discussed with patient, patient's family and Neurosurgery.  Right groin sheath  was discontinued at 0952. Hemostasis was obtained with approximately 8 minutes of manual compression.     No active bleeding, no hematoma, no ecchymosis.   Quick clot and safeguard balloon dressing applied at 1000.  Patient transferred to neuro ICU in stable condition.

## 2024-11-02 NOTE — PROGRESS NOTE ADULT - ASSESSMENT
63y old RHD Female w abrupt onset of abdomina pain/ headache/ nausea/ vomiting/ dizziness/ neck pain and weakness/ dizziness when upright at ~7PM yesterday after using a pulsating neck massager.  Pt presented to ED at [facility] and CTB was obtained and significant for acute subarachnoid hemorrhage in the prepontine cistern and extending into the left perimesencephalic and ambient cisterns & pt was transferred to [facility] for further eval/ management.     Plan:   no immediate NSx intervention indicated at this time  CTA H/N performed and negative, pt with stable CTH  neuro-checks q1 HR   NPO as pt will go for angiogram in the morning. Case discussed with Dr. Shahid  HOB > 30 degrees  hold all AC/AP/ ASA or chemical DVT PPx  OOB w assist/ fall precaution   SBP <140mmHg until angio performed   PT/OT    Case discussed with Dr. Pereyra

## 2024-11-02 NOTE — CONSULT NOTE ADULT - CONSULT REASON
SAH
tx from [facility] for CT Brain reported prepontine SAH, pt denied trauma

## 2024-11-02 NOTE — PROGRESS NOTE ADULT - ASSESSMENT
63 year old female with no past medical history who presents with headache, nausea, and vomiting. CT head reveals acute subarachnoid hemorrhage in the prepontine cistern.  HLD     Plan  Neuro  Q2 neuro checks   diagnostic angio this morning  dc Keppra   Nimodipine 60mg every 4 hours  goal euvolemia, nimodipine    CV  SBP goal less than 140  PRN Labetalol  PRN Hydralazine   start statin    Respiratory  RA    GI  adv diet as tolerated  Bowel Regimen: Senna and Miralax      Sodium chloride 0.9% at 75 mL/hr while NPO  Monitor input and output, euvolemic goal  voiding    Heme  DVT prophylaxis SCDs  start lovenox    Endo  5.7 A1c
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
## 2024-11-02 NOTE — PROGRESS NOTE ADULT - SUBJECTIVE AND OBJECTIVE BOX
HPI:  63 year old female with no past medical history who presents with headache, nausea, and vomiting which began last night. Patient states that her symptoms began with abdominal pain for which she performed abdominal massage and she began experiencing strong headaches along with nausea and vomiting shortly after. Reports that she took over the counter Magnesium for her symptoms and went to bed. This morning, patient states that she continued to experience headache which worsened with standing and walking. Denies any current nausea, vomiting, or abdominal pain. Denies any episodes of vomiting today. Denies any changes to vision, difficulties speaking, or difficulty with moving the extremities. Notes that her headache improves with laying in bed.  (01 Nov 2024 17:21)      INTERVAL HPI/OVERNIGHT EVENTS:  Patient seen during evening rounds resting comfortably in bed. Pt had no complaints at time of exam. Pt is NPO for angio today with Dr. Shahid. Repeat CT imaging showing stability of SAH and no vascular abnormality seen on CTA however radiology still recommending angio     Vital Signs Last 24 Hrs  T(C): 36.3 (01 Nov 2024 23:00), Max: 36.7 (01 Nov 2024 16:12)  T(F): 97.3 (01 Nov 2024 23:00), Max: 98 (01 Nov 2024 16:12)  HR: 69 (01 Nov 2024 23:45) (62 - 86)  BP: 123/78 (01 Nov 2024 23:45) (91/50 - 141/66)  BP(mean): 91 (01 Nov 2024 23:45) (60 - 92)  RR: 18 (01 Nov 2024 23:45) (10 - 23)  SpO2: 97% (01 Nov 2024 23:45) (95% - 100%)    Parameters below as of 01 Nov 2024 18:01  Patient On (Oxygen Delivery Method): room air    PHYSICAL EXAM:  GENERAL: well-groomed, well-developed  HEAD: Atraumatic, Normocephalic, no palpable step-off appreciated on palpation  EYES: b/l EOMI, PERRL, conjunctiva and sclera clear, peripheral visions grossly intact b/l.  NECK: Supple, nontender to palpation   TS/LS: nontender to palpation midline or paraspinal muscles b/l,   NERVOUS SYSTEM: Alert & Oriented X3, speech is clear and fluent, no dysarthria appreciated. Good concentration & very cooperative;   Motor Strength 5/5 B/L upper and lower extremities, sensory is at baseline and symmetric b/l;   No pronators or ankle clonus appreciated b/l, cerebellar signs grossly intact b/l      LABS:                        12.5   6.59  )-----------( 314      ( 01 Nov 2024 16:40 )             36.7     11-01    138  |  103  |  8.3  ----------------------------<  136[H]  3.4[L]   |  21.0[L]  |  0.34[L]    Ca    9.0      01 Nov 2024 16:40  Phos  2.9     11-01  Mg     2.5     11-01    TPro  8.1  /  Alb  4.3  /  TBili  0.6  /  DBili  x   /  AST  21  /  ALT  18  /  AlkPhos  95  11-01    PT/INR - ( 01 Nov 2024 16:40 )   PT: 11.4 sec;   INR: 0.98 ratio         PTT - ( 01 Nov 2024 16:40 )  PTT:32.4 sec  Urinalysis Basic - ( 01 Nov 2024 16:40 )    Color: x / Appearance: x / SG: x / pH: x  Gluc: 136 mg/dL / Ketone: x  / Bili: x / Urobili: x   Blood: x / Protein: x / Nitrite: x   Leuk Esterase: x / RBC: x / WBC x   Sq Epi: x / Non Sq Epi: x / Bacteria: x        11-01 @ 07:01  -  11-02 @ 00:59  --------------------------------------------------------  IN: 425 mL / OUT: 250 mL / NET: 175 mL        RADIOLOGY & ADDITIONAL TESTS:  < from: CT Angio Head w/ IV Cont (11.01.24 @ 19:59) >  IMPRESSION:    1.  Stable perimesencephalic hemorrhage, without evidence of an   underlying vascular lesion.  2.  Catheter angiography and neurosurgical consultation recommended.    < end of copied text >    < from: CT Angio Head w/ IV Cont (11.01.24 @ 19:59) >  There is fetal origin of the right PCA. There is calcified plaque at both   carotid siphons without significant associated narrowing.    < end of copied text >

## 2024-11-02 NOTE — CHART NOTE - NSCHARTNOTEFT_GEN_A_CORE
Neurointerventional Surgery  Pre-Procedure Note        HPI:  63 year old female with no past medical history who presents with headache, nausea, and vomiting which began last night. Patient states that her symptoms began with abdominal pain for which she performed abdominal massage and she began experiencing strong headaches along with nausea and vomiting shortly after. Reports that she took over the counter Magnesium for her symptoms and went to bed. This morning, patient states that she continued to experience headache which worsened with standing and walking. Denies any current nausea, vomiting, or abdominal pain. Denies any episodes of vomiting today. Denies any changes to vision, difficulties speaking, or difficulty with moving the extremities. Notes that her headache improves with laying in bed.  (01 Nov 2024 17:21)      Allergies: Allergy Status Unknown      PAST MEDICAL & SURGICAL HISTORY:  No pertinent past medical history      Social History:  Patient states that she lives between [place] and [place] during the year. 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 Patient states that she does not follow with primary care doctors and does not desire to take medication at home and prefers supplements and natural treatments. Patient is . (01 Nov 2024 17:21)      Current Medications:   chlorhexidine 2% Cloths 1 Application(s) Topical <User Schedule>  dextrose 50% Injectable 25 Gram(s) IV Push once  dextrose 50% Injectable 12.5 Gram(s) IV Push once  dextrose 50% Injectable 25 Gram(s) IV Push once  hydrALAZINE Injectable 10 milliGRAM(s) IV Push every 2 hours PRN  insulin lispro (ADMELOG) corrective regimen sliding scale   SubCutaneous every 6 hours  labetalol Injectable 10 milliGRAM(s) IV Push every 2 hours PRN  levETIRAcetam   Injectable 500 milliGRAM(s) IV Push every 12 hours  niMODipine 60 milliGRAM(s) Oral every 4 hours  polyethylene glycol 3350 17 Gram(s) Oral daily  senna 2 Tablet(s) Oral at bedtime      Physical Exam:  Constitutional: NAD, lying in bed  Neuro  * Mental Status:  GCS 15: Awake, alert, oriented to conversation. No aphasia or difficulty speaking. No dysarthria. Able to name objects and their function.  * Cranial Nerves: Cnii-Cnxii grossly intact. PERRL, EOMI, tongue midline, no gaze deviation  * Motor: RUE 5/5, LUE 5/5, RLE 5/5, LLE 5/5, no drift or dysmetria  * Sensory: Sensation intact to light touch  * Reflexes: not assessed   Cardiovascular: Regular rate and rhythm.  Eyes: See neurologic examination with detailed examination of eyes.  ENT: No JVD, Trachea Midline  Respiratory: non labored breathing   Gastrointestinal: Soft, nontender, nondistended.  Genitourinary: [ ] Zuniga, [ x ] No Zuniga.   Musculoskeletal: No muscle wasting noted, (See neurologic assessment for full muscle strength assessment)   Skin:  no wounds, no redness, no abrasions noted  Hematologic / Lymph / Immunologic: No bleeding from IV sites or wounds      [facility] SS:  DATE: 11/2/24   TIME: 0715  1A: Level of consciousness (0-3): 0  1B: Questions (0-2): 0    1C: Commands (0-2): 0  2: Gaze (0-2): 0  3: Visual fields (0-3): 0  4: Facial palsy (0-3): 0  MOTOR:  5A: Left arm motor drift (0-4): 0  5B: Right arm motor drift (0-4): 0  6A: Left leg motor drift (0-4): 0  6B: Right leg motor drift (0-4): 0  7: Limb ataxia (0-2): 0  SENSORY:  8: Sensation (0-2): 0  SPEECH:  9: Language (0-3): 0  10: Dysarthria (0-2): 0  EXTINCTION:  11: Extinction/inattention (0-2): 0    TOTAL SCORE: 0       Labs:                         11.2   8.03  )-----------( 275      ( 02 Nov 2024 03:30 )             32.1       11-02    138  |  106  |  12.1  ----------------------------<  110[H]  3.4[L]   |  19.0[L]  |  0.26[L]    Ca    8.4      02 Nov 2024 03:30  Phos  3.7     11-02  Mg     2.2     11-02    TPro  8.1  /  Alb  4.3  /  TBili  0.6  /  DBili  x   /  AST  21  /  ALT  18  /  AlkPhos  95  11-01    PT/INR - ( 01 Nov 2024 16:40 )   PT: 11.4 sec;   INR: 0.98 ratio    PTT - ( 01 Nov 2024 16:40 )  PTT:32.4 sec      Assessment/Plan:   This is a 63y  year old Female  presents with perimesencephalic SAH. Patient presents to neuro-IR for selective cerebral angiography.     Procedure, goals, risks, benefits and alternatives  were discussed with patient.  All questions were answered.  Risks include but are not limited to stroke, vessel injury, hemorrhage, and or groin hematoma.  Patient demonstrates understanding  of all risks involved with this procedure and wishes to continue.   Appropriate  consent was obtained from patient and consent is in the patient's chart.

## 2024-11-02 NOTE — PATIENT PROFILE ADULT - FALL HARM RISK - HARM RISK INTERVENTIONS

## 2024-11-02 NOTE — PROGRESS NOTE ADULT - SUBJECTIVE AND OBJECTIVE BOX
Chief complaint:   Patient is a 63y old  Female who presents with a chief complaint of Prepontine Subarachnoid aneurysm (02 Nov 2024 00:59)    HPI:  63 year old female with no past medical history who presents with headache, nausea, and vomiting which began last night. Patient states that her symptoms began with abdominal pain for which she performed abdominal massage and she began experiencing strong headaches along with nausea and vomiting shortly after. Reports that she took over the counter Magnesium for her symptoms and went to bed. This morning, patient states that she continued to experience headache which worsened with standing and walking. Denies any current nausea, vomiting, or abdominal pain. Denies any episodes of vomiting today. Denies any changes to vision, difficulties speaking, or difficulty with moving the extremities. Notes that her headache improves with laying in bed.  (01 Nov 2024 17:21)        24hr EVENTS:      ROS: [ ]  Unable to assess due to mental status   All other systems negative    -----------------------------------------------------------------------------------------------------------------------------------------------------------------------------------  ICU Vital Signs Last 24 Hrs  T(C): 36.6 (02 Nov 2024 07:42), Max: 36.7 (01 Nov 2024 16:12)  T(F): 97.8 (02 Nov 2024 07:42), Max: 98 (01 Nov 2024 16:12)  HR: 74 (02 Nov 2024 07:30) (52 - 86)  BP: 136/69 (02 Nov 2024 07:00) (81/50 - 141/66)  BP(mean): 89 (02 Nov 2024 07:00) (59 - 92)  ABP: --  ABP(mean): --  RR: 17 (02 Nov 2024 07:30) (10 - 23)  SpO2: 100% (02 Nov 2024 07:30) (95% - 100%)    O2 Parameters below as of 02 Nov 2024 07:00  Patient On (Oxygen Delivery Method): room air            I&O's Summary    01 Nov 2024 07:01  -  02 Nov 2024 07:00  --------------------------------------------------------  IN: 1350 mL / OUT: 450 mL / NET: 900 mL        MEDICATIONS  (STANDING):  chlorhexidine 2% Cloths 1 Application(s) Topical <User Schedule>  dextrose 50% Injectable 25 Gram(s) IV Push once  dextrose 50% Injectable 12.5 Gram(s) IV Push once  dextrose 50% Injectable 25 Gram(s) IV Push once  insulin lispro (ADMELOG) corrective regimen sliding scale   SubCutaneous every 6 hours  levETIRAcetam   Injectable 500 milliGRAM(s) IV Push every 12 hours  niMODipine 60 milliGRAM(s) Oral every 4 hours  polyethylene glycol 3350 17 Gram(s) Oral daily  senna 2 Tablet(s) Oral at bedtime    IMAGING:   Recent imaging studies were reviewed.    LAB RESULTS:                          11.2   8.03  )-----------( 275      ( 02 Nov 2024 03:30 )             32.1       PT/INR - ( 01 Nov 2024 16:40 )   PT: 11.4 sec;   INR: 0.98 ratio         PTT - ( 01 Nov 2024 16:40 )  PTT:32.4 sec    11-02    138  |  106  |  12.1  ----------------------------<  110[H]  3.4[L]   |  19.0[L]  |  0.26[L]    Ca    8.4      02 Nov 2024 03:30  Phos  3.7     11-02  Mg     2.2     11-02    TPro  8.1  /  Alb  4.3  /  TBili  0.6  /  DBili  x   /  AST  21  /  ALT  18  /  AlkPhos  95  11-01    -----------------------------------------------------------------------------------------------------------------------------------------------------------------------------------    PHYSICAL EXAM:  General: Calm, laying in bed  HEENT: MMM  Neuro:  -Mental status- No acute distress, AOx3, conversational, following commands  -CN- PERRL 3mm, EOMI, tongue midline, face symmetric  -Motor- full strength in all ext  -Sensation- intact to LT   -Coordination- no dysmetria noted    CV: RRR  Pulm: Clear to auscultation  Abd: Soft, nontender, nondistended  Ext: No edema  Skin: warm, dry

## 2024-11-02 NOTE — CONSULT NOTE ADULT - ASSESSMENT
63y old RHD Female w abrupt onset of abdomina pain/ headache/ nausea/ vomiting/ dizziness/ neck pain and weakness/ dizziness when upright at ~7PM yesterday after using a pulsating neck massager.  Pt presented to ED at [facility] and CTB was obtained and significant for acute subarachnoid hemorrhage in the prepontine cistern and extending into the left perimesencephalic and ambient cisterns & pt was transferred to [facility] for further eval/ management.   pt seen in ED and is noted to have a mild left facial droop, o/w intact/ at baseline 
63-year-old Urdu speaking lady with no significant PMH presented with HA; n/v started yesterday night. HA got worse during the day, exacerbated by standing and walking. CTH demonstrated perimesencephalic SAH. No evidence of the aneurysm on CTA h/n.     - SBP <140  - NC q1h  - Start nimodipine   - NPO for DSA tomorrow       Elvis Amador MD

## 2024-11-03 LAB
ANION GAP SERPL CALC-SCNC: 10 MMOL/L — SIGNIFICANT CHANGE UP (ref 5–17)
BUN SERPL-MCNC: 9 MG/DL — SIGNIFICANT CHANGE UP (ref 8–20)
CALCIUM SERPL-MCNC: 7.6 MG/DL — LOW (ref 8.4–10.5)
CHLORIDE SERPL-SCNC: 109 MMOL/L — HIGH (ref 96–108)
CO2 SERPL-SCNC: 20 MMOL/L — LOW (ref 22–29)
CREAT SERPL-MCNC: 0.28 MG/DL — LOW (ref 0.5–1.3)
EGFR: 120 ML/MIN/1.73M2 — SIGNIFICANT CHANGE UP
GLUCOSE SERPL-MCNC: 95 MG/DL — SIGNIFICANT CHANGE UP (ref 70–99)
HCT VFR BLD CALC: 31.8 % — LOW (ref 34.5–45)
HGB BLD-MCNC: 11.1 G/DL — LOW (ref 11.5–15.5)
MAGNESIUM SERPL-MCNC: 2.4 MG/DL — SIGNIFICANT CHANGE UP (ref 1.6–2.6)
MCHC RBC-ENTMCNC: 30.3 PG — SIGNIFICANT CHANGE UP (ref 27–34)
MCHC RBC-ENTMCNC: 34.9 G/DL — SIGNIFICANT CHANGE UP (ref 32–36)
MCV RBC AUTO: 86.9 FL — SIGNIFICANT CHANGE UP (ref 80–100)
PHOSPHATE SERPL-MCNC: 2.8 MG/DL — SIGNIFICANT CHANGE UP (ref 2.4–4.7)
PLATELET # BLD AUTO: 264 K/UL — SIGNIFICANT CHANGE UP (ref 150–400)
POTASSIUM SERPL-MCNC: 3.1 MMOL/L — LOW (ref 3.5–5.3)
POTASSIUM SERPL-SCNC: 3.1 MMOL/L — LOW (ref 3.5–5.3)
RBC # BLD: 3.66 M/UL — LOW (ref 3.8–5.2)
RBC # FLD: 12.8 % — SIGNIFICANT CHANGE UP (ref 10.3–14.5)
SODIUM SERPL-SCNC: 139 MMOL/L — SIGNIFICANT CHANGE UP (ref 135–145)
WBC # BLD: 6.59 K/UL — SIGNIFICANT CHANGE UP (ref 3.8–10.5)
WBC # FLD AUTO: 6.59 K/UL — SIGNIFICANT CHANGE UP (ref 3.8–10.5)

## 2024-11-03 PROCEDURE — 99232 SBSQ HOSP IP/OBS MODERATE 35: CPT

## 2024-11-03 PROCEDURE — 99291 CRITICAL CARE FIRST HOUR: CPT

## 2024-11-03 RX ORDER — ACETAMINOPHEN 500 MG
1000 TABLET ORAL ONCE
Refills: 0 | Status: COMPLETED | OUTPATIENT
Start: 2024-11-03 | End: 2024-11-03

## 2024-11-03 RX ORDER — POTASSIUM CHLORIDE 10 MEQ
40 TABLET, EXTENDED RELEASE ORAL EVERY 4 HOURS
Refills: 0 | Status: COMPLETED | OUTPATIENT
Start: 2024-11-03 | End: 2024-11-03

## 2024-11-03 RX ORDER — ACETAMINOPHEN 500 MG
650 TABLET ORAL EVERY 6 HOURS
Refills: 0 | Status: DISCONTINUED | OUTPATIENT
Start: 2024-11-03 | End: 2024-11-06

## 2024-11-03 RX ORDER — POTASSIUM CHLORIDE 10 MEQ
40 TABLET, EXTENDED RELEASE ORAL EVERY 4 HOURS
Refills: 0 | Status: DISCONTINUED | OUTPATIENT
Start: 2024-11-03 | End: 2024-11-03

## 2024-11-03 RX ADMIN — Medication 40 MILLIEQUIVALENT(S): at 05:03

## 2024-11-03 RX ADMIN — NIMODIPINE 60 MILLIGRAM(S): 30 CAPSULE, LIQUID FILLED ORAL at 01:47

## 2024-11-03 RX ADMIN — CYCLOBENZAPRINE HYDROCHLORIDE 5 MILLIGRAM(S): 30 CAPSULE, EXTENDED RELEASE ORAL at 14:38

## 2024-11-03 RX ADMIN — Medication 20 MILLIGRAM(S): at 22:05

## 2024-11-03 RX ADMIN — Medication 2 TABLET(S): at 22:09

## 2024-11-03 RX ADMIN — CYCLOBENZAPRINE HYDROCHLORIDE 5 MILLIGRAM(S): 30 CAPSULE, EXTENDED RELEASE ORAL at 22:06

## 2024-11-03 RX ADMIN — NIMODIPINE 60 MILLIGRAM(S): 30 CAPSULE, LIQUID FILLED ORAL at 05:03

## 2024-11-03 RX ADMIN — Medication 500 MILLILITER(S): at 06:55

## 2024-11-03 RX ADMIN — NIMODIPINE 60 MILLIGRAM(S): 30 CAPSULE, LIQUID FILLED ORAL at 22:06

## 2024-11-03 RX ADMIN — Medication 650 MILLIGRAM(S): at 18:05

## 2024-11-03 RX ADMIN — Medication 40 MILLIEQUIVALENT(S): at 05:02

## 2024-11-03 RX ADMIN — NIMODIPINE 60 MILLIGRAM(S): 30 CAPSULE, LIQUID FILLED ORAL at 14:38

## 2024-11-03 RX ADMIN — POLYETHYLENE GLYCOL 3350 17 GRAM(S): 17 POWDER, FOR SOLUTION ORAL at 12:34

## 2024-11-03 RX ADMIN — CYCLOBENZAPRINE HYDROCHLORIDE 5 MILLIGRAM(S): 30 CAPSULE, EXTENDED RELEASE ORAL at 05:03

## 2024-11-03 RX ADMIN — Medication 5 MILLIGRAM(S): at 18:02

## 2024-11-03 RX ADMIN — Medication 650 MILLIGRAM(S): at 18:35

## 2024-11-03 RX ADMIN — CHLORHEXIDINE GLUCONATE 1 APPLICATION(S): 40 SOLUTION TOPICAL at 05:02

## 2024-11-03 RX ADMIN — NIMODIPINE 60 MILLIGRAM(S): 30 CAPSULE, LIQUID FILLED ORAL at 10:24

## 2024-11-03 RX ADMIN — Medication 40 MILLIGRAM(S): at 22:10

## 2024-11-03 NOTE — PROGRESS NOTE ADULT - ASSESSMENT
63 year old female with no past medical history who presents with headache, nausea, and vomiting. CT head reveals acute subarachnoid hemorrhage in the prepontine cistern.  HLD     Plan  Neuro  Q2 neuro checks   diagnostic angio- unremarkable  Nimodipine 60mg every 4 hours  goal euvolemia  flexeril, prn tylenol    CV  SBP goal less than 140  PRN Labetalol  PRN Hydralazine   statin    Respiratory  RA  OOB    GI  adv diet as tolerated  Bowel Regimen: Senna and Miralax      Monitor input and output, euvolemic goal  voiding    Heme  DVT prophylaxis SCDs  lovenox    Endo  5.7 A1c

## 2024-11-03 NOTE — PROGRESS NOTE ADULT - SUBJECTIVE AND OBJECTIVE BOX
Chief complaint:   Patient is a 63y old  Female who presents with a chief complaint of Prepontine Subarachnoid aneurysm (03 Nov 2024 00:07)    HPI:  63 year old female with no past medical history who presents with headache, nausea, and vomiting which began last night. Patient states that her symptoms began with abdominal pain for which she performed abdominal massage and she began experiencing strong headaches along with nausea and vomiting shortly after. Reports that she took over the counter Magnesium for her symptoms and went to bed. This morning, patient states that she continued to experience headache which worsened with standing and walking. Denies any current nausea, vomiting, or abdominal pain. Denies any episodes of vomiting today. Denies any changes to vision, difficulties speaking, or difficulty with moving the extremities. Notes that her headache improves with laying in bed.  (01 Nov 2024 17:21)        24hr EVENTS: IV fluids for euvolemia      ROS: mild neck pain  All other systems negative    -----------------------------------------------------------------------------------------------------------------------------------------------------------------------------------  ICU Vital Signs Last 24 Hrs  T(C): 36.8 (03 Nov 2024 07:23), Max: 37 (02 Nov 2024 20:00)  T(F): 98.2 (03 Nov 2024 07:23), Max: 98.6 (02 Nov 2024 20:00)  HR: 75 (03 Nov 2024 08:00) (52 - 75)  BP: 149/69 (03 Nov 2024 08:00) (82/40 - 149/69)  BP(mean): 92 (03 Nov 2024 08:00) (54 - 109)  ABP: --  ABP(mean): --  RR: 16 (03 Nov 2024 08:00) (10 - 24)  SpO2: 100% (03 Nov 2024 08:00) (93% - 100%)    O2 Parameters below as of 03 Nov 2024 08:00  Patient On (Oxygen Delivery Method): room air            I&O's Summary    02 Nov 2024 08:01  -  03 Nov 2024 07:00  --------------------------------------------------------  IN: 1800 mL / OUT: 2400 mL / NET: -600 mL        MEDICATIONS  (STANDING):  atorvastatin 20 milliGRAM(s) Oral at bedtime  chlorhexidine 2% Cloths 1 Application(s) Topical <User Schedule>  cyclobenzaprine 5 milliGRAM(s) Oral three times a day  enoxaparin Injectable 40 milliGRAM(s) SubCutaneous <User Schedule>  niMODipine 60 milliGRAM(s) Oral every 4 hours  polyethylene glycol 3350 17 Gram(s) Oral daily  senna 2 Tablet(s) Oral at bedtime      RESPIRATORY:        IMAGING:   Recent imaging studies were reviewed.    LAB RESULTS:                          11.1   6.59  )-----------( 264      ( 03 Nov 2024 02:32 )             31.8       PT/INR - ( 01 Nov 2024 16:40 )   PT: 11.4 sec;   INR: 0.98 ratio         PTT - ( 01 Nov 2024 16:40 )  PTT:32.4 sec    11-03    139  |  109[H]  |  9.0  ----------------------------<  95  3.1[L]   |  20.0[L]  |  0.28[L]    Ca    7.6[L]      03 Nov 2024 02:32  Phos  2.8     11-03  Mg     2.4     11-03    TPro  8.1  /  Alb  4.3  /  TBili  0.6  /  DBili  x   /  AST  21  /  ALT  18  /  AlkPhos  95  11-01      -----------------------------------------------------------------------------------------------------------------------------------------------------------------------------------    PHYSICAL EXAM: [place] speaking  General: Calm, laying in bed  HEENT: MMM  Neuro:  -Mental status- No acute distress, AOx3, conversational, following commands  -CN- PERRL 3mm, EOMI, tongue midline, face symmetric  -Motor- full strength in all ext  -Sensation- intact to LT   -Coordination- no dysmetria noted    CV: RRR  Pulm: Clear to auscultation  Abd: Soft, nontender, nondistended  Ext: No edema  Skin: warm, dry

## 2024-11-03 NOTE — PROGRESS NOTE ADULT - ASSESSMENT
· Assessment	    63y old RHD Female w abrupt onset of abdomina pain/ headache/ nausea/ vomiting/ dizziness/ neck pain and weakness/ dizziness when upright at ~7PM yesterday after using a pulsating neck massager.  Pt presented to ED at [facility] and CTB was obtained and significant for acute subarachnoid hemorrhage in the prepontine cistern and extending into the left perimesencephalic and ambient cisterns & pt was transferred to [facility] for further eval/ management.     Plan:   no immediate NSx intervention indicated at this time  CTA H/N performed and negative, pt with stable CTH. Angio negative for vascular lesion  neuro-checks q2 HR   HOB > 30 degrees  Nimodipine, statin  Enox ppx  OOB w assist/ fall precaution   SBP <160 as angio was negative  Maintain euvolemia   PT/OT    Case to be discussed with Dr. Pereyra in the morning

## 2024-11-03 NOTE — OCCUPATIONAL THERAPY INITIAL EVALUATION ADULT - FINE MOTOR COORDINATION, LEFT HAND, MANIPULATION OF OBJECTS, OT EVAL
Please call patient with the following info:    Folate is normal so no signs of a deficiency.  I would like patient to follow up with hematology to discuss possible causes of her anemia - referral placed   normal performance

## 2024-11-03 NOTE — OCCUPATIONAL THERAPY INITIAL EVALUATION ADULT - PERTINENT HX OF CURRENT PROBLEM, REHAB EVAL
As per MD note: 63 year old female with no past medical history who presents with headache, nausea, and vomiting which began last night. Patient states that her symptoms began with abdominal pain for which she performed abdominal massage and she began experiencing strong headaches along with nausea and vomiting shortly after. Reports that she took over the counter Magnesium for her symptoms and went to bed. This morning, patient states that she continued to experience headache which worsened with standing and walking. Denies any current nausea, vomiting, or abdominal pain. Denies any episodes of vomiting today. Denies any changes to vision, difficulties speaking, or difficulty with moving the extremities. Notes that her headache improves with laying in bed.
General

## 2024-11-03 NOTE — PROGRESS NOTE ADULT - SUBJECTIVE AND OBJECTIVE BOX
HPI:  63 year old female with no past medical history who presents with headache, nausea, and vomiting which began last night. Patient states that her symptoms began with abdominal pain for which she performed abdominal massage and she began experiencing strong headaches along with nausea and vomiting shortly after. Reports that she took over the counter Magnesium for her symptoms and went to bed. This morning, patient states that she continued to experience headache which worsened with standing and walking. Denies any current nausea, vomiting, or abdominal pain. Denies any episodes of vomiting today. Denies any changes to vision, difficulties speaking, or difficulty with moving the extremities. Notes that her headache improves with laying in bed.  (01 Nov 2024 17:21)      INTERVAL HPI/OVERNIGHT EVENTS:  Patient seen resting comfortably in bed, pleasant mood. No complaints at time of my exam. Pt had angio yesterday which was negative. Voiding spontaneously.    Vital Signs Last 24 Hrs  T(C): 36.6 (02 Nov 2024 23:33), Max: 37 (02 Nov 2024 20:00)  T(F): 97.9 (02 Nov 2024 23:33), Max: 98.6 (02 Nov 2024 20:00)  HR: 55 (03 Nov 2024 00:00) (52 - 74)  BP: 102/56 (03 Nov 2024 00:00) (81/50 - 140/69)  BP(mean): 72 (03 Nov 2024 00:00) (54 - 109)  RR: 18 (03 Nov 2024 00:00) (10 - 24)  SpO2: 94% (03 Nov 2024 00:00) (94% - 100%)    Parameters below as of 02 Nov 2024 23:25  Patient On (Oxygen Delivery Method): room air    PHYSICAL EXAM:  General: Calm, laying in bed  HEENT: MMM  Neuro:  -Mental status- No acute distress, AOx3, conversational, following commands  -CN- PERRL 3mm, EOMI, tongue midline, face symmetric  -Motor- full strength in all ext  -Sensation- intact to LT   -Coordination- no dysmetria noted  CV: RRR  Pulm: Clear to auscultation  Abd: Soft, nontender, nondistended  Ext: No edema  Skin: warm, dry    LABS:                        11.2   8.03  )-----------( 275      ( 02 Nov 2024 03:30 )             32.1     11-02    138  |  106  |  12.1  ----------------------------<  110[H]  3.4[L]   |  19.0[L]  |  0.26[L]    Ca    8.4      02 Nov 2024 03:30  Phos  3.7     11-02  Mg     2.2     11-02    TPro  8.1  /  Alb  4.3  /  TBili  0.6  /  DBili  x   /  AST  21  /  ALT  18  /  AlkPhos  95  11-01    PT/INR - ( 01 Nov 2024 16:40 )   PT: 11.4 sec;   INR: 0.98 ratio         PTT - ( 01 Nov 2024 16:40 )  PTT:32.4 sec  Urinalysis Basic - ( 02 Nov 2024 03:30 )    Color: x / Appearance: x / SG: x / pH: x  Gluc: 110 mg/dL / Ketone: x  / Bili: x / Urobili: x   Blood: x / Protein: x / Nitrite: x   Leuk Esterase: x / RBC: x / WBC x   Sq Epi: x / Non Sq Epi: x / Bacteria: x        11-01 @ 07:01  -  11-02 @ 07:00  --------------------------------------------------------  IN: 1350 mL / OUT: 450 mL / NET: 900 mL    11-02 @ 08:01  -  11-03 @ 00:07  --------------------------------------------------------  IN: 800 mL / OUT: 1400 mL / NET: -600 mL        RADIOLOGY & ADDITIONAL TESTS:  < from: CT Angio Head w/ IV Cont (11.01.24 @ 19:59) >  IMPRESSION:    1.  Stable perimesencephalic hemorrhage, without evidence of an   underlying vascular lesion.  2.  Catheter angiography and neurosurgical consultation recommended.    < end of copied text >

## 2024-11-04 ENCOUNTER — TRANSCRIPTION ENCOUNTER (OUTPATIENT)
Age: 64
End: 2024-11-04

## 2024-11-04 LAB
ANION GAP SERPL CALC-SCNC: 13 MMOL/L — SIGNIFICANT CHANGE UP (ref 5–17)
BUN SERPL-MCNC: 11.1 MG/DL — SIGNIFICANT CHANGE UP (ref 8–20)
CALCIUM SERPL-MCNC: 8.5 MG/DL — SIGNIFICANT CHANGE UP (ref 8.4–10.5)
CHLORIDE SERPL-SCNC: 104 MMOL/L — SIGNIFICANT CHANGE UP (ref 96–108)
CO2 SERPL-SCNC: 22 MMOL/L — SIGNIFICANT CHANGE UP (ref 22–29)
CREAT SERPL-MCNC: 0.31 MG/DL — LOW (ref 0.5–1.3)
EGFR: 117 ML/MIN/1.73M2 — SIGNIFICANT CHANGE UP
GLUCOSE SERPL-MCNC: 101 MG/DL — HIGH (ref 70–99)
HCT VFR BLD CALC: 34.1 % — LOW (ref 34.5–45)
HGB BLD-MCNC: 11.9 G/DL — SIGNIFICANT CHANGE UP (ref 11.5–15.5)
MAGNESIUM SERPL-MCNC: 2 MG/DL — SIGNIFICANT CHANGE UP (ref 1.6–2.6)
MCHC RBC-ENTMCNC: 30.3 PG — SIGNIFICANT CHANGE UP (ref 27–34)
MCHC RBC-ENTMCNC: 34.9 G/DL — SIGNIFICANT CHANGE UP (ref 32–36)
MCV RBC AUTO: 86.8 FL — SIGNIFICANT CHANGE UP (ref 80–100)
PHOSPHATE SERPL-MCNC: 3.4 MG/DL — SIGNIFICANT CHANGE UP (ref 2.4–4.7)
PLATELET # BLD AUTO: 274 K/UL — SIGNIFICANT CHANGE UP (ref 150–400)
POTASSIUM SERPL-MCNC: 3.4 MMOL/L — LOW (ref 3.5–5.3)
POTASSIUM SERPL-SCNC: 3.4 MMOL/L — LOW (ref 3.5–5.3)
RBC # BLD: 3.93 M/UL — SIGNIFICANT CHANGE UP (ref 3.8–5.2)
RBC # FLD: 12.5 % — SIGNIFICANT CHANGE UP (ref 10.3–14.5)
SODIUM SERPL-SCNC: 139 MMOL/L — SIGNIFICANT CHANGE UP (ref 135–145)
WBC # BLD: 5.53 K/UL — SIGNIFICANT CHANGE UP (ref 3.8–10.5)
WBC # FLD AUTO: 5.53 K/UL — SIGNIFICANT CHANGE UP (ref 3.8–10.5)

## 2024-11-04 PROCEDURE — 99291 CRITICAL CARE FIRST HOUR: CPT

## 2024-11-04 PROCEDURE — 99232 SBSQ HOSP IP/OBS MODERATE 35: CPT

## 2024-11-04 RX ORDER — POTASSIUM CHLORIDE 10 MEQ
40 TABLET, EXTENDED RELEASE ORAL ONCE
Refills: 0 | Status: COMPLETED | OUTPATIENT
Start: 2024-11-04 | End: 2024-11-04

## 2024-11-04 RX ADMIN — CYCLOBENZAPRINE HYDROCHLORIDE 5 MILLIGRAM(S): 30 CAPSULE, EXTENDED RELEASE ORAL at 13:20

## 2024-11-04 RX ADMIN — CYCLOBENZAPRINE HYDROCHLORIDE 5 MILLIGRAM(S): 30 CAPSULE, EXTENDED RELEASE ORAL at 05:27

## 2024-11-04 RX ADMIN — NIMODIPINE 60 MILLIGRAM(S): 30 CAPSULE, LIQUID FILLED ORAL at 13:20

## 2024-11-04 RX ADMIN — Medication 1000 MILLIGRAM(S): at 00:37

## 2024-11-04 RX ADMIN — Medication 5 MILLIGRAM(S): at 05:25

## 2024-11-04 RX ADMIN — CYCLOBENZAPRINE HYDROCHLORIDE 5 MILLIGRAM(S): 30 CAPSULE, EXTENDED RELEASE ORAL at 21:07

## 2024-11-04 RX ADMIN — NIMODIPINE 60 MILLIGRAM(S): 30 CAPSULE, LIQUID FILLED ORAL at 17:29

## 2024-11-04 RX ADMIN — NIMODIPINE 60 MILLIGRAM(S): 30 CAPSULE, LIQUID FILLED ORAL at 10:05

## 2024-11-04 RX ADMIN — Medication 2 TABLET(S): at 21:07

## 2024-11-04 RX ADMIN — NIMODIPINE 60 MILLIGRAM(S): 30 CAPSULE, LIQUID FILLED ORAL at 05:25

## 2024-11-04 RX ADMIN — CHLORHEXIDINE GLUCONATE 1 APPLICATION(S): 40 SOLUTION TOPICAL at 05:28

## 2024-11-04 RX ADMIN — Medication 20 MILLIGRAM(S): at 21:07

## 2024-11-04 RX ADMIN — Medication 40 MILLIEQUIVALENT(S): at 05:25

## 2024-11-04 RX ADMIN — NIMODIPINE 60 MILLIGRAM(S): 30 CAPSULE, LIQUID FILLED ORAL at 21:08

## 2024-11-04 RX ADMIN — Medication 400 MILLIGRAM(S): at 00:07

## 2024-11-04 RX ADMIN — Medication 40 MILLIGRAM(S): at 21:08

## 2024-11-04 NOTE — PROGRESS NOTE ADULT - ASSESSMENT
63y old RHD Female w abrupt onset of abdomina pain/ headache/ nausea/ vomiting/ dizziness/ neck pain and weakness/ dizziness when upright at ~7PM yesterday after using a pulsating neck massager.  Pt presented to ED at [facility] and CTB was obtained and significant for acute subarachnoid hemorrhage in the prepontine cistern and extending into the left perimesencephalic and ambient cisterns & pt was transferred to [facility] for further eval/ management.     Plan:   no immediate NSx intervention indicated at this time  CTA H/N performed and negative, pt with stable CTH. Angio negative for vascular lesion  neuro-checks q2 HR   HOB > 30 degrees  Nimodipine, statin  Enox ppx  OOB w assist/ fall precaution   SBP <160 as angio was negative  Maintain euvolemia   PT/OT  SQ lovenox and compression device for DVT ppx    Case to be discussed with Dr. Pereyra in the morning

## 2024-11-04 NOTE — DISCHARGE NOTE NURSING/CASE MANAGEMENT/SOCIAL WORK - PATIENT PORTAL LINK FT
You can access the FollowMyHealth Patient Portal offered by [facility] by registering at the following website: [email or web address]. By joining [facility]’s FollowMyHealth portal, you will also be able to view your health information using other applications (apps) compatible with our system.

## 2024-11-04 NOTE — PROGRESS NOTE ADULT - SUBJECTIVE AND OBJECTIVE BOX
HPI:  63 year old female with no past medical history who presents with headache, nausea, and vomiting which began last night. Patient states that her symptoms began with abdominal pain for which she performed abdominal massage and she began experiencing strong headaches along with nausea and vomiting shortly after. Reports that she took over the counter Magnesium for her symptoms and went to bed. This morning, patient states that she continued to experience headache which worsened with standing and walking. Denies any current nausea, vomiting, or abdominal pain. Denies any episodes of vomiting today. Denies any changes to vision, difficulties speaking, or difficulty with moving the extremities. Notes that her headache improves with laying in bed.  (01 Nov 2024 17:21)      INTERVAL HPI/OVERNIGHT EVENTS:  Patient seen during rounds, resting comfortably in bed. Pt is voiding spontaneously, neuro exam remains intact    Vital Signs Last 24 Hrs  T(C): 36.6 (03 Nov 2024 23:38), Max: 37.2 (03 Nov 2024 15:54)  T(F): 97.9 (03 Nov 2024 23:38), Max: 99 (03 Nov 2024 15:54)  HR: 55 (04 Nov 2024 01:00) (52 - 81)  BP: 111/53 (04 Nov 2024 01:00) (91/54 - 149/69)  BP(mean): 71 (04 Nov 2024 01:00) (64 - 127)  RR: 18 (04 Nov 2024 01:00) (12 - 22)  SpO2: 98% (04 Nov 2024 01:00) (95% - 100%)    Parameters below as of 04 Nov 2024 00:00  Patient On (Oxygen Delivery Method): room air    PHYSICAL EXAM: [place] speaking  General: Calm, laying in bed  HEENT: MMM  Neuro:  -Mental status- No acute distress, AOx3, conversational, following commands  -CN- PERRL 3mm, EOMI, tongue midline, face symmetric  -Motor- full strength in all ext  -Sensation- intact to LT   -Coordination- no dysmetria noted  CV: RRR  Pulm: nonlabored breathing, no accessory use  Abd: Soft, nontender, nondistended  Ext: No edema  Skin: warm,     LABS:                        11.1   6.59  )-----------( 264      ( 03 Nov 2024 02:32 )             31.8     11-03    139  |  109[H]  |  9.0  ----------------------------<  95  3.1[L]   |  20.0[L]  |  0.28[L]    Ca    7.6[L]      03 Nov 2024 02:32  Phos  2.8     11-03  Mg     2.4     11-03        Urinalysis Basic - ( 03 Nov 2024 02:32 )    Color: x / Appearance: x / SG: x / pH: x  Gluc: 95 mg/dL / Ketone: x  / Bili: x / Urobili: x   Blood: x / Protein: x / Nitrite: x   Leuk Esterase: x / RBC: x / WBC x   Sq Epi: x / Non Sq Epi: x / Bacteria: x        11-02 @ 08:01  -  11-03 @ 07:00  --------------------------------------------------------  IN: 1800 mL / OUT: 2400 mL / NET: -600 mL    11-03 @ 07:01  -  11-04 @ 01:17  --------------------------------------------------------  IN: 800 mL / OUT: 1100 mL / NET: -300 mL        RADIOLOGY & ADDITIONAL TESTS:  < from: CT Angio Head w/ IV Cont (11.01.24 @ 19:59) >  IMPRESSION:    1.  Stable perimesencephalic hemorrhage, without evidence of an   underlying vascular lesion.  2.  Catheter angiography and neurosurgical consultation recommended.    < end of copied text >

## 2024-11-04 NOTE — PROGRESS NOTE ADULT - SUBJECTIVE AND OBJECTIVE BOX
Chief complaint:   Patient is a 63y old  Female who presents with a chief complaint of Prepontine Subarachnoid aneurysm (04 Nov 2024 01:16)    HPI:  63 year old female with no past medical history who presents with headache, nausea, and vomiting which began last night. Patient states that her symptoms began with abdominal pain for which she performed abdominal massage and she began experiencing strong headaches along with nausea and vomiting shortly after. Reports that she took over the counter Magnesium for her symptoms and went to bed. This morning, patient states that she continued to experience headache which worsened with standing and walking. Denies any current nausea, vomiting, or abdominal pain. Denies any episodes of vomiting today. Denies any changes to vision, difficulties speaking, or difficulty with moving the extremities. Notes that her headache improves with laying in bed.  (01 Nov 2024 17:21)        24hr EVENTS: no issues      ROS: [ ] neck pain with movement  All other systems negative    -----------------------------------------------------------------------------------------------------------------------------------------------------------------------------------  ICU Vital Signs Last 24 Hrs  T(C): 36.9 (04 Nov 2024 04:02), Max: 37.2 (03 Nov 2024 15:54)  T(F): 98.4 (04 Nov 2024 04:02), Max: 99 (03 Nov 2024 15:54)  HR: 64 (04 Nov 2024 08:00) (54 - 81)  BP: 135/65 (04 Nov 2024 08:00) (91/54 - 142/118)  BP(mean): 87 (04 Nov 2024 08:00) (64 - 127)  ABP: --  ABP(mean): --  RR: 17 (04 Nov 2024 08:00) (13 - 22)  SpO2: 98% (04 Nov 2024 08:00) (96% - 100%)    O2 Parameters below as of 04 Nov 2024 08:00  Patient On (Oxygen Delivery Method): room air            I&O's Summary    03 Nov 2024 07:01  -  04 Nov 2024 07:00  --------------------------------------------------------  IN: 800 mL / OUT: 1400 mL / NET: -600 mL        MEDICATIONS  (STANDING):  atorvastatin 20 milliGRAM(s) Oral at bedtime  bisacodyl 5 milliGRAM(s) Oral every 12 hours  chlorhexidine 2% Cloths 1 Application(s) Topical <User Schedule>  cyclobenzaprine 5 milliGRAM(s) Oral three times a day  enoxaparin Injectable 40 milliGRAM(s) SubCutaneous <User Schedule>  niMODipine 60 milliGRAM(s) Oral every 4 hours  polyethylene glycol 3350 17 Gram(s) Oral daily  senna 2 Tablet(s) Oral at bedtime      RESPIRATORY:        IMAGING:   Recent imaging studies were reviewed.    LAB RESULTS:                          11.9   5.53  )-----------( 274      ( 04 Nov 2024 02:23 )             34.1           11-04    139  |  104  |  11.1  ----------------------------<  101[H]  3.4[L]   |  22.0  |  0.31[L]    Ca    8.5      04 Nov 2024 02:23  Phos  3.4     11-04  Mg     2.0     11-04    -----------------------------------------------------------------------------------------------------------------------------------------------------------------------------------    PHYSICAL EXAM:  General: Calm, laying in bed  HEENT: MMM  Neuro:  -Mental status- No acute distress, AOx3, conversational, following commands  -CN- PERRL 3mm, EOMI, tongue midline, face symmetric  -Motor- full strength in all ext  -Sensation- intact to LT   -Coordination- no dysmetria noted    CV: RRR  Pulm: Clear to auscultation  Abd: Soft, nontender, nondistended  Ext: No edema  Skin: warm, dry     Chief complaint:   Patient is a 63y old  Female who presents with a chief complaint of Prepontine Subarachnoid aneurysm (04 Nov 2024 01:16)    HPI:  63 year old female with no past medical history who presents with headache, nausea, and vomiting which began last night. Patient states that her symptoms began with abdominal pain for which she performed abdominal massage and she began experiencing strong headaches along with nausea and vomiting shortly after. Reports that she took over the counter Magnesium for her symptoms and went to bed. This morning, patient states that she continued to experience headache which worsened with standing and walking. Denies any current nausea, vomiting, or abdominal pain. Denies any episodes of vomiting today. Denies any changes to vision, difficulties speaking, or difficulty with moving the extremities. Notes that her headache improves with laying in bed.  (01 Nov 2024 17:21)    24hr EVENTS: no issues      ROS: [ ] neck pain with movement  All other systems negative    -----------------------------------------------------------------------------------------------------------------------------------------------------------------------------------  ICU Vital Signs Last 24 Hrs  T(C): 36.9 (04 Nov 2024 04:02), Max: 37.2 (03 Nov 2024 15:54)  T(F): 98.4 (04 Nov 2024 04:02), Max: 99 (03 Nov 2024 15:54)  HR: 64 (04 Nov 2024 08:00) (54 - 81)  BP: 135/65 (04 Nov 2024 08:00) (91/54 - 142/118)  BP(mean): 87 (04 Nov 2024 08:00) (64 - 127)  ABP: --  ABP(mean): --  RR: 17 (04 Nov 2024 08:00) (13 - 22)  SpO2: 98% (04 Nov 2024 08:00) (96% - 100%)    O2 Parameters below as of 04 Nov 2024 08:00  Patient On (Oxygen Delivery Method): room air      I&O's Summary    03 Nov 2024 07:01  -  04 Nov 2024 07:00  --------------------------------------------------------  IN: 800 mL / OUT: 1400 mL / NET: -600 mL      MEDICATIONS  (STANDING):  atorvastatin 20 milliGRAM(s) Oral at bedtime  bisacodyl 5 milliGRAM(s) Oral every 12 hours  chlorhexidine 2% Cloths 1 Application(s) Topical <User Schedule>  cyclobenzaprine 5 milliGRAM(s) Oral three times a day  enoxaparin Injectable 40 milliGRAM(s) SubCutaneous <User Schedule>  niMODipine 60 milliGRAM(s) Oral every 4 hours  polyethylene glycol 3350 17 Gram(s) Oral daily  senna 2 Tablet(s) Oral at bedtime      RESPIRATORY:        IMAGING:   Recent imaging studies were reviewed.    LAB RESULTS:                          11.9   5.53  )-----------( 274      ( 04 Nov 2024 02:23 )             34.1           11-04    139  |  104  |  11.1  ----------------------------<  101[H]  3.4[L]   |  22.0  |  0.31[L]    Ca    8.5      04 Nov 2024 02:23  Phos  3.4     11-04  Mg     2.0     11-04    -----------------------------------------------------------------------------------------------------------------------------------------------------------------------------------    PHYSICAL EXAM:  General: Calm, laying in bed  HEENT: MMM  Neuro:  -Mental status- No acute distress, AOx3, conversational, following commands  -CN- PERRL 3mm, EOMI, tongue midline, face symmetric  -Motor- full strength in all ext  -Sensation- intact to LT   -Coordination- no dysmetria noted    CV: RRR  Pulm: Clear to auscultation  Abd: Soft, nontender, nondistended  Ext: No edema  Skin: warm, dry

## 2024-11-04 NOTE — DISCHARGE NOTE NURSING/CASE MANAGEMENT/SOCIAL WORK - FINANCIAL ASSISTANCE
[facility] provides services at a reduced cost to those who are determined to be eligible through [facility]’s financial assistance program. Information regarding [facility]’s financial assistance program can be found by going to [email or web address] or by calling [phone].

## 2024-11-04 NOTE — PROGRESS NOTE ADULT - ASSESSMENT
63F with no PMH who presented with HA, difficulty walking, found to have perimesencephalic SAH. Underwent angiogram 11/2/24 which was negative for SAH.   - POD2  - Groin / exam intact    Plan:  - Discussed with Dr. Shahid  - Q4 hour neuro checks  - SBP goal 100-160  - DVT prophylaxis: SCDs, lovenox  - Maintain normothermia  - Maintain euvolemia, strict I&Os, supplement for net negative PRN  - Nimodipine 60q4 as tolerated by HR and BP   - TCDs daily as able  - No need for repeat angio at this time  - Can follow up with Dr. Shahid in 1-2 weeks after discharge

## 2024-11-04 NOTE — PROGRESS NOTE ADULT - SUBJECTIVE AND OBJECTIVE BOX
Patient is a 63y old  Female who presents with a chief complaint of Prepontine Subarachnoid aneurysm (2024 08:37)    HPI:  63 year old female with no past medical history who presents with headache, nausea, and vomiting which began last night. Patient states that her symptoms began with abdominal pain for which she performed abdominal massage and she began experiencing strong headaches along with nausea and vomiting shortly after. Reports that she took over the counter Magnesium for her symptoms and went to bed. This morning, patient states that she continued to experience headache which worsened with standing and walking. Denies any current nausea, vomiting, or abdominal pain. Denies any episodes of vomiting today. Denies any changes to vision, difficulties speaking, or difficulty with moving the extremities. Notes that her headache improves with laying in bed.  (2024 17:21)      Interval history:  Patient seen and examined by neuro IR team. She underwent diagnostic angiogram 24, negative. Patient reports that she feels well, has no acute complaints at this time. TCDs performed this am. No other acute events reported.       Vital Signs Last 24 Hrs  T(C): 36.8 (2024 11:58), Max: 37.2 (2024 15:54)  T(F): 98.2 (2024 11:58), Max: 99 (2024 15:54)  HR: 83 (2024 12:00) (54 - 83)  BP: 111/62 (2024 12:00) (91/54 - 142/118)  BP(mean): 77 (2024 12:00) (64 - 127)  RR: 26 (2024 12:00) (13 - 26)  SpO2: 100% (2024 12:00) (94% - 100%)    Parameters below as of 2024 12:00  Patient On (Oxygen Delivery Method): room air      Physical Exam:  Constitutional: NAD, lying in bed  Neuro  * Mental Status:  GCS 15: Awake, alert, oriented to conversation. No aphasia or difficulty speaking. No dysarthria.   * Cranial Nerves: Cnii-Cnxii grossly intact. PERRL, EOMI, tongue midline, no gaze deviation  * Motor: RUE 5/5, LUE 5/5, RLE 5/5, LLE 5/5, no drift   * Sensory: Sensation intact to light touch  * Reflexes: not assessed   Groin: soft, nontender, no hematoma, no ecchymoses       LABS:                        11.9   5.53  )-----------( 274      ( 2024 02:23 )             34.1         139  |  104  |  11.1  ----------------------------<  101[H]  3.4[L]   |  22.0  |  0.31[L]    Ca    8.5      2024 02:23  Phos  3.4       Mg     2.0           I&O's Summary    2024 07:  -  2024 07:00  --------------------------------------------------------  IN: 800 mL / OUT: 1400 mL / NET: -600 mL    2024 07:01  -  2024 12:50  --------------------------------------------------------  IN: 480 mL / OUT: 0 mL / NET: 480 mL      Medications:  MEDICATIONS  (STANDING):  atorvastatin 20 milliGRAM(s) Oral at bedtime  bisacodyl 5 milliGRAM(s) Oral every 12 hours  chlorhexidine 2% Cloths 1 Application(s) Topical <User Schedule>  cyclobenzaprine 5 milliGRAM(s) Oral three times a day  enoxaparin Injectable 40 milliGRAM(s) SubCutaneous <User Schedule>  niMODipine 60 milliGRAM(s) Oral every 4 hours  polyethylene glycol 3350 17 Gram(s) Oral daily  senna 2 Tablet(s) Oral at bedtime    MEDICATIONS  (PRN):  acetaminophen     Tablet .. 650 milliGRAM(s) Oral every 6 hours PRN Moderate Pain (4 - 6)  bisacodyl Suppository 10 milliGRAM(s) Rectal once PRN Constipation  hydrALAZINE Injectable 10 milliGRAM(s) IV Push every 2 hours PRN SBP>160  labetalol Injectable 10 milliGRAM(s) IV Push every 2 hours PRN SBP>160      RADIOLOGY & ADDITIONAL STUDIES:  < from: CT Head No Cont (24 @ 19:59) >  IMPRESSION:    1.  Stable perimesencephalic hemorrhage, without evidence of an   underlying vascular lesion.  2.  Catheter angiography and neurosurgical consultation recommended.    Patient Name: JACOB LOPEZ  MRN: [id number], : 56    --- End of Report ---    DANO SINGH MD; Attending Radiologist  This document has been electronically signed. 2024  8:18PM    < end of copied text >

## 2024-11-04 NOTE — PROGRESS NOTE ADULT - NS PANP OPT1 GEN_ALL_CORE
I independently performed the documented history, exam, and medical decision making.
I attest my time as PA/NP is greater than 50% of the total combined time spent on qualifying patient care activities by the PA/NP and attending.

## 2024-11-04 NOTE — DISCHARGE NOTE NURSING/CASE MANAGEMENT/SOCIAL WORK - NSDCPEFALRISK_GEN_ALL_CORE
For information on Fall & Injury Prevention, visit: [email or web address] OR  [email or web address] OR  https://www.cdc.gov/steadi/patient.html

## 2024-11-04 NOTE — PROGRESS NOTE ADULT - ASSESSMENT
63 year old female with no past medical history who presents with headache, nausea, and vomiting. CT head reveals acute subarachnoid hemorrhage in the prepontine cistern.  HLD     Plan  Neuro  Q2 neuro checks   diagnostic angio- unremarkable  Nimodipine 60mg every 4 hours, TCDs  goal euvolemia  flexeril, prn tylenol    CV  SBP goal less than 140  PRN Labetalol  PRN Hydralazine   statin    Respiratory  RA  OOB    GI  adv diet as tolerated  Bowel Regimen: Senna and Miralax, dulcolax  LBM PTA      Monitor input and output, euvolemic goal  voiding    Heme  DVT prophylaxis SCDs  lovenox    Endo  5.7 A1c  63 year old female with no past medical history who presents with headache, nausea, and vomiting. CT head reveals acute subarachnoid hemorrhage in the prepontine cistern.  HLD     Plan PBD 3  Neuro  Q4 neuro checks   diagnostic angio- unremarkable  Nimodipine 60mg every 4 hours, TCDs  goal euvolemia  flexeril, prn tylenol    CV  SBP   PRN Labetalol  PRN Hydralazine   statin    Respiratory  RA  OOB    GI  adv diet as tolerated  Bowel Regimen: Senna and Miralax, dulcolax  LBM 11/3      Monitor input and output, euvolemic goal  voiding    Heme  DVT prophylaxis SCDs  lovenox    Endo  5.7 A1c

## 2024-11-05 LAB
ANION GAP SERPL CALC-SCNC: 13 MMOL/L — SIGNIFICANT CHANGE UP (ref 5–17)
BUN SERPL-MCNC: 12.5 MG/DL — SIGNIFICANT CHANGE UP (ref 8–20)
CALCIUM SERPL-MCNC: 8.6 MG/DL — SIGNIFICANT CHANGE UP (ref 8.4–10.5)
CHLORIDE SERPL-SCNC: 101 MMOL/L — SIGNIFICANT CHANGE UP (ref 96–108)
CO2 SERPL-SCNC: 21 MMOL/L — LOW (ref 22–29)
CREAT SERPL-MCNC: 0.32 MG/DL — LOW (ref 0.5–1.3)
EGFR: 116 ML/MIN/1.73M2 — SIGNIFICANT CHANGE UP
GLUCOSE SERPL-MCNC: 98 MG/DL — SIGNIFICANT CHANGE UP (ref 70–99)
HCT VFR BLD CALC: 36.1 % — SIGNIFICANT CHANGE UP (ref 34.5–45)
HGB BLD-MCNC: 12.6 G/DL — SIGNIFICANT CHANGE UP (ref 11.5–15.5)
MAGNESIUM SERPL-MCNC: 2.1 MG/DL — SIGNIFICANT CHANGE UP (ref 1.6–2.6)
MCHC RBC-ENTMCNC: 29.9 PG — SIGNIFICANT CHANGE UP (ref 27–34)
MCHC RBC-ENTMCNC: 34.9 G/DL — SIGNIFICANT CHANGE UP (ref 32–36)
MCV RBC AUTO: 85.5 FL — SIGNIFICANT CHANGE UP (ref 80–100)
PHOSPHATE SERPL-MCNC: 4.1 MG/DL — SIGNIFICANT CHANGE UP (ref 2.4–4.7)
PLATELET # BLD AUTO: 318 K/UL — SIGNIFICANT CHANGE UP (ref 150–400)
POTASSIUM SERPL-MCNC: 4.1 MMOL/L — SIGNIFICANT CHANGE UP (ref 3.5–5.3)
POTASSIUM SERPL-SCNC: 4.1 MMOL/L — SIGNIFICANT CHANGE UP (ref 3.5–5.3)
RBC # BLD: 4.22 M/UL — SIGNIFICANT CHANGE UP (ref 3.8–5.2)
RBC # FLD: 12.7 % — SIGNIFICANT CHANGE UP (ref 10.3–14.5)
SODIUM SERPL-SCNC: 135 MMOL/L — SIGNIFICANT CHANGE UP (ref 135–145)
WBC # BLD: 5.94 K/UL — SIGNIFICANT CHANGE UP (ref 3.8–10.5)
WBC # FLD AUTO: 5.94 K/UL — SIGNIFICANT CHANGE UP (ref 3.8–10.5)

## 2024-11-05 PROCEDURE — 99291 CRITICAL CARE FIRST HOUR: CPT

## 2024-11-05 RX ORDER — SODIUM CHLORIDE 9 MG/ML
250 INJECTION, SOLUTION INTRAMUSCULAR; INTRAVENOUS; SUBCUTANEOUS ONCE
Refills: 0 | Status: COMPLETED | OUTPATIENT
Start: 2024-11-05 | End: 2024-11-05

## 2024-11-05 RX ADMIN — CYCLOBENZAPRINE HYDROCHLORIDE 5 MILLIGRAM(S): 30 CAPSULE, EXTENDED RELEASE ORAL at 05:32

## 2024-11-05 RX ADMIN — POLYETHYLENE GLYCOL 3350 17 GRAM(S): 17 POWDER, FOR SOLUTION ORAL at 12:35

## 2024-11-05 RX ADMIN — NIMODIPINE 60 MILLIGRAM(S): 30 CAPSULE, LIQUID FILLED ORAL at 21:41

## 2024-11-05 RX ADMIN — NIMODIPINE 60 MILLIGRAM(S): 30 CAPSULE, LIQUID FILLED ORAL at 17:10

## 2024-11-05 RX ADMIN — CYCLOBENZAPRINE HYDROCHLORIDE 5 MILLIGRAM(S): 30 CAPSULE, EXTENDED RELEASE ORAL at 21:41

## 2024-11-05 RX ADMIN — Medication 5 MILLIGRAM(S): at 05:32

## 2024-11-05 RX ADMIN — CHLORHEXIDINE GLUCONATE 1 APPLICATION(S): 40 SOLUTION TOPICAL at 05:33

## 2024-11-05 RX ADMIN — NIMODIPINE 60 MILLIGRAM(S): 30 CAPSULE, LIQUID FILLED ORAL at 01:00

## 2024-11-05 RX ADMIN — NIMODIPINE 60 MILLIGRAM(S): 30 CAPSULE, LIQUID FILLED ORAL at 10:36

## 2024-11-05 RX ADMIN — Medication 650 MILLIGRAM(S): at 08:16

## 2024-11-05 RX ADMIN — Medication 650 MILLIGRAM(S): at 21:41

## 2024-11-05 RX ADMIN — Medication 650 MILLIGRAM(S): at 22:00

## 2024-11-05 RX ADMIN — Medication 20 MILLIGRAM(S): at 21:42

## 2024-11-05 RX ADMIN — NIMODIPINE 60 MILLIGRAM(S): 30 CAPSULE, LIQUID FILLED ORAL at 14:47

## 2024-11-05 RX ADMIN — Medication 650 MILLIGRAM(S): at 09:16

## 2024-11-05 RX ADMIN — CYCLOBENZAPRINE HYDROCHLORIDE 5 MILLIGRAM(S): 30 CAPSULE, EXTENDED RELEASE ORAL at 14:47

## 2024-11-05 RX ADMIN — NIMODIPINE 60 MILLIGRAM(S): 30 CAPSULE, LIQUID FILLED ORAL at 05:31

## 2024-11-05 RX ADMIN — SODIUM CHLORIDE 250 MILLILITER(S): 9 INJECTION, SOLUTION INTRAMUSCULAR; INTRAVENOUS; SUBCUTANEOUS at 06:50

## 2024-11-05 RX ADMIN — Medication 40 MILLIGRAM(S): at 21:44

## 2024-11-05 NOTE — DIETITIAN INITIAL EVALUATION ADULT - PERTINENT MEDS FT
MEDICATIONS  (STANDING):  atorvastatin 20 milliGRAM(s) Oral at bedtime  bisacodyl 5 milliGRAM(s) Oral every 12 hours  chlorhexidine 2% Cloths 1 Application(s) Topical <User Schedule>  cyclobenzaprine 5 milliGRAM(s) Oral three times a day  enoxaparin Injectable 40 milliGRAM(s) SubCutaneous <User Schedule>  niMODipine 60 milliGRAM(s) Oral every 4 hours  polyethylene glycol 3350 17 Gram(s) Oral daily  senna 2 Tablet(s) Oral at bedtime    MEDICATIONS  (PRN):  acetaminophen     Tablet .. 650 milliGRAM(s) Oral every 6 hours PRN Moderate Pain (4 - 6)  bisacodyl Suppository 10 milliGRAM(s) Rectal once PRN Constipation  hydrALAZINE Injectable 10 milliGRAM(s) IV Push every 2 hours PRN SBP>160  labetalol Injectable 10 milliGRAM(s) IV Push every 2 hours PRN SBP>160

## 2024-11-05 NOTE — DIETITIAN INITIAL EVALUATION ADULT - OTHER INFO
63 year old female with no past medical history who presents with headache, nausea, and vomiting. CT head reveals acute subarachnoid hemorrhage in the prepontine cistern.

## 2024-11-05 NOTE — PROGRESS NOTE ADULT - ASSESSMENT
63y old RHD Female w abrupt onset of abdomina pain/ headache/ nausea/ vomiting/ dizziness/ neck pain and weakness/ dizziness when upright at ~7PM yesterday after using a pulsating neck massager.  Pt presented to ED at [facility] and CTB was obtained and significant for acute subarachnoid hemorrhage in the prepontine cistern and extending into the left perimesencephalic and ambient cisterns & pt was transferred to [facility] for further eval/ management.     Plan:   no immediate NSx intervention indicated at this time  CTA H/N performed and negative, pt with stable CTH. Angio negative for vascular lesion  neuro-checks q4 HR   HOB > 30 degrees  Nimodipine, statin  TCDs daily  OOB w assist/ fall precaution   SBP <160 as angio was negative  Maintain euvolemia   PT/OT  SQ lovenox and compression device for DVT ppx    Case to be discussed with Dr. Pereyra in the morning

## 2024-11-05 NOTE — DIETITIAN INITIAL EVALUATION ADULT - ADD RECOMMEND
Encourage adequate po intake  Encourage po intake, monitor diet tolerance, and provide assistance at meals as needed.   RD to follow up for interview.

## 2024-11-05 NOTE — PROGRESS NOTE ADULT - SUBJECTIVE AND OBJECTIVE BOX
HPI:  63 year old female with no past medical history who presents with headache, nausea, and vomiting which began last night. Patient states that her symptoms began with abdominal pain for which she performed abdominal massage and she began experiencing strong headaches along with nausea and vomiting shortly after. Reports that she took over the counter Magnesium for her symptoms and went to bed. This morning, patient states that she continued to experience headache which worsened with standing and walking. Denies any current nausea, vomiting, or abdominal pain. Denies any episodes of vomiting today. Denies any changes to vision, difficulties speaking, or difficulty with moving the extremities. Notes that her headache improves with laying in bed.  (01 Nov 2024 17:21)      INTERVAL HPI/OVERNIGHT EVENTS:  Seen with  Giovani # [id number]  63y Female s/p cerebral angiogram POD#3 seen lying comfortably in bed. Tolerating diet. Patient reports HA which travels down spine into legs. States improved with medications ordered. Neuro exam remains stable. No events reported by nursing.     Vital Signs Last 24 Hrs  T(C): 37 (04 Nov 2024 23:31), Max: 37.2 (04 Nov 2024 15:52)  T(F): 98.6 (04 Nov 2024 23:31), Max: 99 (04 Nov 2024 15:52)  HR: 72 (05 Nov 2024 01:00) (54 - 88)  BP: 108/72 (05 Nov 2024 01:00) (93/57 - 135/65)  BP(mean): 83 (05 Nov 2024 01:00) (69 - 102)  RR: 17 (05 Nov 2024 01:00) (13 - 26)  SpO2: 97% (05 Nov 2024 01:00) (94% - 100%)    Parameters below as of 04 Nov 2024 23:00  Patient On (Oxygen Delivery Method): room air        PHYSICAL EXAM:  General: No Acute Distress     Neurological: Awake, alert & oriented to person, place and time, Following Commands, PERRL, EOMI, Visual fields intact, Slight left nasolabial fold flattening, Speech Fluent, Moving all extremities     Muscle Strength: RUE: 5/5 LUE: 5/5 RLE: 5/5 LLE: 5/5  No Drift     Sensation Intact to Light Touch     Cerebellar: There is no dysmetria on finger to nose testing.    Gait : deferred    Pulmonary: non labored breathing, no use of accessory muscles    Cardiovascular:  Regular Rate and Rhythm     Gastrointestinal: Soft, Nontender, Nondistended       LABS:                        11.9   5.53  )-----------( 274      ( 04 Nov 2024 02:23 )             34.1     11-04    139  |  104  |  11.1  ----------------------------<  101[H]  3.4[L]   |  22.0  |  0.31[L]    Ca    8.5      04 Nov 2024 02:23  Phos  3.4     11-04  Mg     2.0     11-04        Urinalysis Basic - ( 04 Nov 2024 02:23 )    Color: x / Appearance: x / SG: x / pH: x  Gluc: 101 mg/dL / Ketone: x  / Bili: x / Urobili: x   Blood: x / Protein: x / Nitrite: x   Leuk Esterase: x / RBC: x / WBC x   Sq Epi: x / Non Sq Epi: x / Bacteria: x        11-03 @ 07:01 - 11-04 @ 07:00  --------------------------------------------------------  IN: 800 mL / OUT: 1400 mL / NET: -600 mL    11-04 @ 07:01  -  11-05 @ 01:52  --------------------------------------------------------  IN: 720 mL / OUT: 250 mL / NET: 470 mL        RADIOLOGY & ADDITIONAL TESTS:  < from: CT Angio Head w/ IV Cont (11.01.24 @ 19:59) >  IMPRESSION:    1.  Stable perimesencephalic hemorrhage, without evidence of an   underlying vascular lesion.    < end of copied text >

## 2024-11-05 NOTE — DIETITIAN INITIAL EVALUATION ADULT - WEIGHT (LBS)
70 y.o.male    Patient Care Team:  Suresh Rolon Jr., MD as PCP - General (Family Medicine)  Asya Henao MD as Consulting Physician (Endocrinology)  John Mcguire MD as Consulting Physician (Pulmonary Disease)  Tamia Carrillo MD as Consulting Physician (Ophthalmology)  Jamal Prince MD as Consulting Physician (Orthopedic Surgery)    Chief Complaint:  Dm2, hypothyroid, hyperlipidemia f/u    Subjective   Patient presents today to follow-up for his type 2 diabetes.  He has been doing well however he did have some setback in July of this year.  He reports tripping and injuring his leg which led to a UTI and resulting sepsis.  He is doing much better and was able to have surgery on his injured leg.  He is currently wearing a brace and getting back to his baseline.     HPI    Andrea Fam 70 y.o. presents with Type 2 dm as a F/u patient.     Below information Copied from visit 4/20/20 with Dr Henao and updated as follows:  Type 2 dm - Diagnosed about 20 years ago. Was started on oral agents initially. Insulin was started about 10 years ago.   Today in clinic patient reports that he is still on Toujeo 28 units in the morning, Ozempic 1 mg once a week, Januvia 100mg and metformin 1000mg twice daily.  He has not been able to be active in the last few months related to his leg injury, surgery and current brace.    He has been checking his blood sugars on average 1x/day. Sometimes he doesn't check at all.    His average blood sugars are around 100.  He reports no significant low or high blood sugars.  9/2020 last eye exam, no retinopathy   Diabetic neuropathy is stable on gabapentin 600mg BID.  No history of CAD, CKD, CVA per patient.  reports he has lost weight, maybe 5 to 10 pounds  He tries to follow diabetic diet for most part.       Hyperlipidemia  On Lipitor 20 mg oral daily, fish oil 2 times daily. Denies myalgias  Stable    Sleep apnea  Remains compliant with her CPAP  machine.  Stable    Hypothyroidism  On levothyroxine 50 mcg oral daily.  Stable    Interval History      The following portions of the patient's history were reviewed and updated as appropriate: allergies, current medications, past family history, past medical history, past social history, past surgical history and problem list.    Past Medical History:   Diagnosis Date   • Arthritis    • High cholesterol    • History of recent fall    • Hypothyroidism    • Obstructive sleep apnea on CPAP    • Peripheral neuropathy     FINGERS AND LOWER LEGS   • Quadriceps muscle rupture     RIGHT   • Quadriceps muscle rupture 2020   • Sepsis (CMS/[facility]) 2006    FROM UTI, HOSPITALIZED   • Testosterone deficiency    • Type 2 diabetes mellitus (CMS/[facility])    • UTI (urinary tract infection) 2020     Family History   Problem Relation Age of Onset   • Alzheimer's disease Mother    • Lymphoma Paternal Grandmother    • Cerebral aneurysm Paternal Grandfather    • Colon cancer Neg Hx    • Malig Hyperthermia Neg Hx      Social History     Socioeconomic History   • Marital status:      Spouse name: Not on file   • Number of children: Not on file   • Years of education: Not on file   • Highest education level: Not on file   Tobacco Use   • Smoking status: Former Smoker     Packs/day: 1.50     Years: 30.00     Pack years: 45.00     Types: Cigarettes     Quit date:      Years since quittin.8   • Smokeless tobacco: Never Used   • Tobacco comment: QUIT AGE 48   Substance and Sexual Activity   • Alcohol use: Yes     Comment: 2  drinks per  month   • Drug use: No   • Sexual activity: Defer     Allergies   Allergen Reactions   • Lisinopril Other (See Comments)     Diplopia         Current Outpatient Medications:   •  atorvastatin (LIPITOR) 10 MG tablet, Take 10 mg by mouth Every Night., Disp: , Rfl:   •  Blood Glucose Monitoring Suppl (ONETOUCH VERIO) w/Device kit, 1 Device As Needed (USE TO TO TEST BLOOD SUGAR 2 TIMES DAILY).,  Disp: 1 kit, Rfl: 0  •  Cholecalciferol (VITAMIN D) 2000 UNITS tablet, Take 2,000 Units by mouth 2 (Two) Times a Day., Disp: , Rfl:   •  FOLIC ACID PO, Take 400 mcg by mouth 2 (Two) Times a Day. WILL HOLD PRIOR TO SURGERY, Disp: , Rfl:   •  gabapentin (Neurontin) 300 MG capsule, Take 2 capsules by mouth 2 (Two) Times a Day. (Patient taking differently: Take 600 mg by mouth 2 (Two) Times a Day. 2 tabs in am and 3 tabs in pm), Disp: 360 capsule, Rfl: 0  •  Insulin Glargine (TOUJEO SOLOSTAR SC), Inject 28 Units under the skin into the appropriate area as directed Every Morning., Disp: , Rfl:   •  Insulin Pen Needle (NovoFine Plus) 32G X 4 MM misc, Use to inject insulin 2 times daily, Disp: 200 each, Rfl: 3  •  levothyroxine (SYNTHROID, LEVOTHROID) 50 MCG tablet, TAKE 1 TABLET DAILY, Disp: 90 tablet, Rfl: 3  •  metFORMIN (GLUCOPHAGE) 500 MG tablet, TAKE 2 TABLETS TWICE A DAY WITH MEALS, Disp: 360 tablet, Rfl: 4  •  Multiple Vitamin (MULTI-VITAMIN DAILY PO), Take 1 tablet by mouth Daily., Disp: , Rfl:   •  Omega-3 Fatty Acids (FISH OIL) 1000 MG capsule capsule, Take 1,200 mg by mouth 2 (Two) Times a Day With Meals., Disp: , Rfl:   •  ONETOUCH DELICA LANCETS 33G misc, USE TO TEST BLOOD SUGAR 2 TIMES DAILY, Disp: 100 each, Rfl: 5  •  ONETOUCH VERIO test strip, USE TO TEST BLOOD SUGAR 2 TIMES DAILY, Disp: 100 each, Rfl: 5  •  vitamin B-12 (CYANOCOBALAMIN) 1000 MCG tablet, Take 1,000 mcg by mouth Every Other Day. TAKES ON ODD NUMBER DAYS WILL HOLD PRIOR TO SURGERY, Disp: , Rfl:   •  vitamin C (ASCORBIC ACID) 500 MG tablet, Take 1,000 mg by mouth 2 (Two) Times a Day. PT TO HOLD PRIOR TO SURGERY, Disp: , Rfl:   •  glucose blood test strip, Use to test blood sugar 2 times daily, Disp: 100 each, Rfl: 9  •  HYDROcodone-acetaminophen (NORCO) 5-325 MG per tablet, Take 1 tablet by mouth Every 4 (Four) Hours As Needed for Severe Pain ., Disp: 30 tablet, Rfl: 0  •  OXcarbazepine (TRILEPTAL) 600 MG tablet, TAKE 1 TABLET IN THE MORNING  127 AND ONE AND ONE-HALF TABLETS IN THE EVENING, Disp: 180 tablet, Rfl: 5  •  Semaglutide, 1 MG/DOSE, (Ozempic, 1 MG/DOSE,) 2 MG/1.5ML solution pen-injector, Inject 1 mg under the skin into the appropriate area as directed 1 (One) Time Per Week., Disp: 6 pen, Rfl: 3        Review of Systems   Constitutional: Positive for activity change. Negative for appetite change, fatigue and unexpected weight change.   HENT: Negative for trouble swallowing and voice change.    Eyes: Negative for photophobia and visual disturbance.   Respiratory: Negative for cough and shortness of breath.    Cardiovascular: Negative for chest pain, palpitations and leg swelling.   Gastrointestinal: Negative for constipation, diarrhea, nausea and vomiting.   Endocrine: Negative for cold intolerance, heat intolerance, polydipsia, polyphagia and polyuria.   Genitourinary: Negative for difficulty urinating and dysuria.   Musculoskeletal: Positive for gait problem. Negative for myalgias.   Neurological: Negative for dizziness and headaches.   Psychiatric/Behavioral: Negative for sleep disturbance. The patient is not nervous/anxious.      Objective       Vitals:    10/20/20 0809   BP: 128/74   Weight: 89.9 kg (198 lb 3.2 oz)     Body mass index is 28.44 kg/m².      Physical Exam  Constitutional:       General: He is not in acute distress.  Cardiovascular:      Rate and Rhythm: Normal rate and regular rhythm.   Pulmonary:      Effort: Pulmonary effort is normal. No respiratory distress.   Musculoskeletal:         General: Signs of injury present. No deformity.   Skin:     General: Skin is warm and dry.   Neurological:      General: No focal deficit present.      Mental Status: He is alert and oriented to person, place, and time.   Psychiatric:         Mood and Affect: Mood normal.         Behavior: Behavior normal.       Results Review:      Admission on 08/04/2020, Discharged on 08/04/2020   Component Date Value Ref Range Status   • Glucose 08/04/2020 110  " 70 - 130 mg/dL Final   • Glucose 08/04/2020 109  70 - 130 mg/dL Final     Lab Results   Component Value Date    HGBA1C 6.20 (H) 04/09/2020    HGBA1C 7.90 (H) 10/22/2019    HGBA1C 7.30 (H) 06/18/2019     Lab Results   Component Value Date    MICROALBUR 15.6 07/25/2018    CREATININE 0.80 07/31/2020     Imaging Results (Most Recent)     None            Assessment and Plan:    Diagnoses and all orders for this visit:    1. Type 2 diabetes mellitus without complication, with long-term current use of insulin (CMS/[facility]) (Primary)  -     Hemoglobin A1c  -     Hemoglobin A1c; Future  -     Comprehensive Metabolic Panel; Future  -     Comprehensive Metabolic Panel  -     Lipid Panel  -     Microalbumin / Creatinine Urine Ratio - Urine, Clean Catch    2. Secondary hypothyroidism  -     TSH  -     T4, free  -     TSH; Future  -     T4, free; Future    3. Mixed hyperlipidemia        Dm 2-stable.  We will stop his januvia since he is tolerating the max dose max dose of GLP-1 Ozempic 1 mg weekly.  Continue good work with diabetic diet.  Increase activity as injury allows.  Low risk for hypoglycemic episodes.    Hypothyroidism -stable.  We will repeat labs today and make medication adjustments as needed.    Hyperlipidemia-stable.  We will repeat labs today and continue current dose.    Follow-up with Dr. Henao in 6 months with labs prior to the visit.      NATALIE Hernandez  10/20/20    [facility] Dragon / transcription disclaimer:     \"Dictated utilizing Dragon dictation\".  "

## 2024-11-05 NOTE — DIETITIAN INITIAL EVALUATION ADULT - PERTINENT LABORATORY DATA
11-05    135  |  101  |  12.5  ----------------------------<  98  4.1   |  21.0[L]  |  0.32[L]    Ca    8.6      05 Nov 2024 02:15  Phos  4.1     11-05  Mg     2.1     11-05    A1C with Estimated Average Glucose Result: 5.7 % (11-02-24 @ 03:30)

## 2024-11-05 NOTE — PROGRESS NOTE ADULT - SUBJECTIVE AND OBJECTIVE BOX
Chief complaint:   Patient is a 63y old  Female who presents with a chief complaint of Prepontine Subarachnoid aneurysm (05 Nov 2024 01:52)    HPI:  63 year old female with no past medical history who presents with headache, nausea, and vomiting which began last night. Patient states that her symptoms began with abdominal pain for which she performed abdominal massage and she began experiencing strong headaches along with nausea and vomiting shortly after. Reports that she took over the counter Magnesium for her symptoms and went to bed. This morning, patient states that she continued to experience headache which worsened with standing and walking. Denies any current nausea, vomiting, or abdominal pain. Denies any episodes of vomiting today. Denies any changes to vision, difficulties speaking, or difficulty with moving the extremities. Notes that her headache improves with laying in bed.  (01 Nov 2024 17:21)        24hr EVENTS:      ROS: [ ]  Unable to assess due to mental status   All other systems negative    -----------------------------------------------------------------------------------------------------------------------------------------------------------------------------------  ICU Vital Signs Last 24 Hrs  T(C): 36.6 (05 Nov 2024 07:23), Max: 37.2 (04 Nov 2024 15:52)  T(F): 97.9 (05 Nov 2024 07:23), Max: 99 (04 Nov 2024 15:52)  HR: 68 (05 Nov 2024 07:10) (64 - 89)  BP: 140/93 (05 Nov 2024 07:10) (93/49 - 140/93)  BP(mean): 107 (05 Nov 2024 07:10) (64 - 107)  ABP: --  ABP(mean): --  RR: 16 (05 Nov 2024 07:10) (12 - 26)  SpO2: 100% (05 Nov 2024 07:10) (94% - 100%)    O2 Parameters below as of 05 Nov 2024 05:00  Patient On (Oxygen Delivery Method): room air            I&O's Summary    04 Nov 2024 07:01  -  05 Nov 2024 07:00  --------------------------------------------------------  IN: 720 mL / OUT: 250 mL / NET: 470 mL        MEDICATIONS  (STANDING):  atorvastatin 20 milliGRAM(s) Oral at bedtime  bisacodyl 5 milliGRAM(s) Oral every 12 hours  chlorhexidine 2% Cloths 1 Application(s) Topical <User Schedule>  cyclobenzaprine 5 milliGRAM(s) Oral three times a day  enoxaparin Injectable 40 milliGRAM(s) SubCutaneous <User Schedule>  niMODipine 60 milliGRAM(s) Oral every 4 hours  polyethylene glycol 3350 17 Gram(s) Oral daily  senna 2 Tablet(s) Oral at bedtime      RESPIRATORY:        IMAGING:   Recent imaging studies were reviewed.    LAB RESULTS:                          12.6   5.94  )-----------( 318      ( 05 Nov 2024 02:15 )             36.1           11-05    135  |  101  |  12.5  ----------------------------<  98  4.1   |  21.0[L]  |  0.32[L]    Ca    8.6      05 Nov 2024 02:15  Phos  4.1     11-05  Mg     2.1     11-05                  -----------------------------------------------------------------------------------------------------------------------------------------------------------------------------------    PHYSICAL EXAM:  General: Calm, laying in bed  HEENT: MMM  Neuro:  -Mental status- No acute distress, AOx3, conversational, following commands  -CN- PERRL 3mm, EOMI, tongue midline, face symmetric  -Motor- full strength in all ext  -Sensation- intact to LT   -Coordination- no dysmetria noted    CV: RRR  Pulm: Clear to auscultation  Abd: Soft, nontender, nondistended  Ext: No edema  Skin: warm, dry       Chief complaint:   Patient is a 63y old  Female who presents with a chief complaint of Prepontine Subarachnoid aneurysm (05 Nov 2024 01:52)    HPI:  63 year old female with no past medical history who presents with headache, nausea, and vomiting which began last night. Patient states that her symptoms began with abdominal pain for which she performed abdominal massage and she began experiencing strong headaches along with nausea and vomiting shortly after. Reports that she took over the counter Magnesium for her symptoms and went to bed. This morning, patient states that she continued to experience headache which worsened with standing and walking. Denies any current nausea, vomiting, or abdominal pain. Denies any episodes of vomiting today. Denies any changes to vision, difficulties speaking, or difficulty with moving the extremities. Notes that her headache improves with laying in bed.  (01 Nov 2024 17:21)    24hr EVENTS:  vasovagal episode, lightheaded standing    ROS: no complaints  All other systems negative    -----------------------------------------------------------------------------------------------------------------------------------------------------------------------------------  ICU Vital Signs Last 24 Hrs  T(C): 36.6 (05 Nov 2024 07:23), Max: 37.2 (04 Nov 2024 15:52)  T(F): 97.9 (05 Nov 2024 07:23), Max: 99 (04 Nov 2024 15:52)  HR: 68 (05 Nov 2024 07:10) (64 - 89)  BP: 140/93 (05 Nov 2024 07:10) (93/49 - 140/93)  BP(mean): 107 (05 Nov 2024 07:10) (64 - 107)  ABP: --  ABP(mean): --  RR: 16 (05 Nov 2024 07:10) (12 - 26)  SpO2: 100% (05 Nov 2024 07:10) (94% - 100%)    O2 Parameters below as of 05 Nov 2024 05:00  Patient On (Oxygen Delivery Method): room air            I&O's Summary    04 Nov 2024 07:01  -  05 Nov 2024 07:00  --------------------------------------------------------  IN: 720 mL / OUT: 250 mL / NET: 470 mL        MEDICATIONS  (STANDING):  atorvastatin 20 milliGRAM(s) Oral at bedtime  bisacodyl 5 milliGRAM(s) Oral every 12 hours  chlorhexidine 2% Cloths 1 Application(s) Topical <User Schedule>  cyclobenzaprine 5 milliGRAM(s) Oral three times a day  enoxaparin Injectable 40 milliGRAM(s) SubCutaneous <User Schedule>  niMODipine 60 milliGRAM(s) Oral every 4 hours  polyethylene glycol 3350 17 Gram(s) Oral daily  senna 2 Tablet(s) Oral at bedtime      IMAGING:   Recent imaging studies were reviewed.    LAB RESULTS:                          12.6   5.94  )-----------( 318      ( 05 Nov 2024 02:15 )             36.1       11-05    135  |  101  |  12.5  ----------------------------<  98  4.1   |  21.0[L]  |  0.32[L]    Ca    8.6      05 Nov 2024 02:15  Phos  4.1     11-05  Mg     2.1     11-05      -----------------------------------------------------------------------------------------------------------------------------------------------------------------------------------    PHYSICAL EXAM:  General: Calm, laying in bed  HEENT: MMM  Neuro:  -Mental status- No acute distress, AOx3, conversational, following commands  -CN- PERRL 3mm, EOMI, tongue midline, face symmetric  -Motor- full strength in all ext  -Sensation- intact to LT   -Coordination- no dysmetria noted    CV: RRR  Pulm: Clear to auscultation  Abd: Soft, nontender, nondistended  Ext: No edema  Skin: warm, dry

## 2024-11-05 NOTE — PROGRESS NOTE ADULT - ASSESSMENT
63 year old female with no past medical history who presents with headache, nausea, and vomiting. CT head reveals acute subarachnoid hemorrhage in the prepontine cistern.  HLD     Plan PBD 4  Neuro  Q4 neuro checks   diagnostic angio- unremarkable  Nimodipine 60mg every 4 hours, TCDs  goal euvolemia  flexeril, prn tylenol    CV  SBP   PRN Labetalol  PRN Hydralazine   statin    Respiratory  RA  OOB    GI  adv diet as tolerated  Bowel Regimen: Senna and Miralax, dulcolax  LBM 11/3      Monitor input and output, euvolemic goal  voiding    Heme  DVT prophylaxis SCDs  lovenox    Endo  5.7 A1c  63 year old female with no past medical history who presents with headache, nausea, and vomiting. CT head reveals acute subarachnoid hemorrhage in the prepontine cistern.  HLD   *prefers homeopathic treatments- not medications    Plan PBD 4  Neuro  Q4 neuro checks   diagnostic angio- unremarkable  Nimodipine 60mg every 4 hours, TCDs  goal euvolemia  flexeril, prn tylenol    CV  SBP   PRN Labetalol  PRN Hydralazine   statin    Respiratory  RA  OOB    GI  adv diet as tolerated  Bowel Regimen: Senna and Miralax, dulcolax  LBM 11/3      Monitor input and output, euvolemic goal  voiding    Heme  DVT prophylaxis SCDs  lovenox    Endo  5.7 A1c

## 2024-11-05 NOTE — DIETITIAN INITIAL EVALUATION ADULT - ORAL INTAKE PTA/DIET HISTORY
Pt sleeping and not waking to name and unable to interview at this time. A1C 5.7 noted. Info obtained from chart.

## 2024-11-05 NOTE — PROGRESS NOTE ADULT - CRITICAL CARE ATTENDING COMMENT
I spent 60 minutes of critical care time examining patient, reviewing vitals, labs, medications, imaging and discussing with the team goals of care to prevent life-threatening in this patient who is at high risk for deterioration or death due to:  SAH
I spent 60 minutes of critical care time examining patient, reviewing vitals, labs, medications, imaging and discussing with the team goals of care to prevent life-threatening in this patient who is at high risk for deterioration or death due to:  SAH
I spent 55 minutes of critical care time examining patient, reviewing vitals, labs, medications, imaging and discussing with the team goals of care to prevent life-threatening in this patient who is at high risk for deterioration or death due to:  sah
I spent 60 minutes of critical care time examining patient, reviewing vitals, labs, medications, imaging and discussing with the team goals of care to prevent life-threatening in this patient who is at high risk for deterioration or death due to:  SAH

## 2024-11-06 ENCOUNTER — TRANSCRIPTION ENCOUNTER (OUTPATIENT)
Age: 64
End: 2024-11-06

## 2024-11-06 VITALS
HEART RATE: 64 BPM | SYSTOLIC BLOOD PRESSURE: 132 MMHG | RESPIRATION RATE: 24 BRPM | OXYGEN SATURATION: 100 % | DIASTOLIC BLOOD PRESSURE: 66 MMHG | TEMPERATURE: 98 F

## 2024-11-06 LAB
ANION GAP SERPL CALC-SCNC: 12 MMOL/L — SIGNIFICANT CHANGE UP (ref 5–17)
BUN SERPL-MCNC: 13.3 MG/DL — SIGNIFICANT CHANGE UP (ref 8–20)
CALCIUM SERPL-MCNC: 9.6 MG/DL — SIGNIFICANT CHANGE UP (ref 8.4–10.5)
CHLORIDE SERPL-SCNC: 100 MMOL/L — SIGNIFICANT CHANGE UP (ref 96–108)
CO2 SERPL-SCNC: 22 MMOL/L — SIGNIFICANT CHANGE UP (ref 22–29)
CREAT SERPL-MCNC: 0.38 MG/DL — LOW (ref 0.5–1.3)
EGFR: 111 ML/MIN/1.73M2 — SIGNIFICANT CHANGE UP
GLUCOSE SERPL-MCNC: 101 MG/DL — HIGH (ref 70–99)
HCT VFR BLD CALC: 39.3 % — SIGNIFICANT CHANGE UP (ref 34.5–45)
HGB BLD-MCNC: 13.6 G/DL — SIGNIFICANT CHANGE UP (ref 11.5–15.5)
MAGNESIUM SERPL-MCNC: 2.4 MG/DL — SIGNIFICANT CHANGE UP (ref 1.6–2.6)
MCHC RBC-ENTMCNC: 30.2 PG — SIGNIFICANT CHANGE UP (ref 27–34)
MCHC RBC-ENTMCNC: 34.6 G/DL — SIGNIFICANT CHANGE UP (ref 32–36)
MCV RBC AUTO: 87.3 FL — SIGNIFICANT CHANGE UP (ref 80–100)
PHOSPHATE SERPL-MCNC: 3.9 MG/DL — SIGNIFICANT CHANGE UP (ref 2.4–4.7)
PLATELET # BLD AUTO: 338 K/UL — SIGNIFICANT CHANGE UP (ref 150–400)
POTASSIUM SERPL-MCNC: 3.6 MMOL/L — SIGNIFICANT CHANGE UP (ref 3.5–5.3)
POTASSIUM SERPL-SCNC: 3.6 MMOL/L — SIGNIFICANT CHANGE UP (ref 3.5–5.3)
RBC # BLD: 4.5 M/UL — SIGNIFICANT CHANGE UP (ref 3.8–5.2)
RBC # FLD: 12.7 % — SIGNIFICANT CHANGE UP (ref 10.3–14.5)
SODIUM SERPL-SCNC: 134 MMOL/L — LOW (ref 135–145)
WBC # BLD: 6.91 K/UL — SIGNIFICANT CHANGE UP (ref 3.8–10.5)
WBC # FLD AUTO: 6.91 K/UL — SIGNIFICANT CHANGE UP (ref 3.8–10.5)

## 2024-11-06 PROCEDURE — 99232 SBSQ HOSP IP/OBS MODERATE 35: CPT

## 2024-11-06 RX ORDER — CYCLOBENZAPRINE HYDROCHLORIDE 30 MG/1
1 CAPSULE, EXTENDED RELEASE ORAL
Qty: 45 | Refills: 0
Start: 2024-11-06 | End: 2024-11-20

## 2024-11-06 RX ORDER — POLYETHYLENE GLYCOL 3350 17 G/17G
17 POWDER, FOR SOLUTION ORAL
Qty: 1 | Refills: 0
Start: 2024-11-06 | End: 2024-12-05

## 2024-11-06 RX ORDER — POTASSIUM CHLORIDE 10 MEQ
40 TABLET, EXTENDED RELEASE ORAL ONCE
Refills: 0 | Status: COMPLETED | OUTPATIENT
Start: 2024-11-06 | End: 2024-11-06

## 2024-11-06 RX ORDER — CYCLOBENZAPRINE HYDROCHLORIDE 30 MG/1
1 CAPSULE, EXTENDED RELEASE ORAL
Qty: 0 | Refills: 0 | DISCHARGE
Start: 2024-11-06

## 2024-11-06 RX ORDER — SODIUM CHLORIDE 9 MG/ML
1000 INJECTION, SOLUTION INTRAMUSCULAR; INTRAVENOUS; SUBCUTANEOUS ONCE
Refills: 0 | Status: COMPLETED | OUTPATIENT
Start: 2024-11-06 | End: 2024-11-06

## 2024-11-06 RX ORDER — SENNA 187 MG
2 TABLET ORAL
Qty: 60 | Refills: 0
Start: 2024-11-06 | End: 2024-12-05

## 2024-11-06 RX ORDER — CYCLOBENZAPRINE HYDROCHLORIDE 30 MG/1
1 CAPSULE, EXTENDED RELEASE ORAL
Refills: 0
Start: 2024-11-06

## 2024-11-06 RX ORDER — ACETAMINOPHEN 500 MG
2 TABLET ORAL
Qty: 0 | Refills: 0 | DISCHARGE
Start: 2024-11-06

## 2024-11-06 RX ADMIN — CHLORHEXIDINE GLUCONATE 1 APPLICATION(S): 40 SOLUTION TOPICAL at 06:20

## 2024-11-06 RX ADMIN — Medication 650 MILLIGRAM(S): at 15:45

## 2024-11-06 RX ADMIN — Medication 650 MILLIGRAM(S): at 15:00

## 2024-11-06 RX ADMIN — NIMODIPINE 60 MILLIGRAM(S): 30 CAPSULE, LIQUID FILLED ORAL at 06:13

## 2024-11-06 RX ADMIN — CYCLOBENZAPRINE HYDROCHLORIDE 5 MILLIGRAM(S): 30 CAPSULE, EXTENDED RELEASE ORAL at 06:13

## 2024-11-06 RX ADMIN — POLYETHYLENE GLYCOL 3350 17 GRAM(S): 17 POWDER, FOR SOLUTION ORAL at 10:20

## 2024-11-06 RX ADMIN — Medication 40 MILLIEQUIVALENT(S): at 10:20

## 2024-11-06 RX ADMIN — SODIUM CHLORIDE 1000 MILLILITER(S): 9 INJECTION, SOLUTION INTRAMUSCULAR; INTRAVENOUS; SUBCUTANEOUS at 10:19

## 2024-11-06 NOTE — OCCUPATIONAL THERAPY INITIAL EVALUATION ADULT - ADDITIONAL COMMENTS
right handed  Pt has a walk in shower  no DME
Pt lives in a private home with her spouse. No steps to navigate. Pt was independent PTA without an assist device. Pt owns no DME. Pt has a walk in shower with curtains, is right handed.

## 2024-11-06 NOTE — PHYSICAL THERAPY INITIAL EVALUATION ADULT - GENERAL OBSERVATIONS, REHAB EVAL
Pt received standing at sink at bedside + telemetry//BP Pt c/o "a little headache" pre/post PT. Pt agreeable to PT
Pt received supine in bed + telemetry//BP Pt c/o 0/10 pre/post PT pain. Pt agreeable to PT

## 2024-11-06 NOTE — PROGRESS NOTE ADULT - PROVIDER SPECIALTY LIST ADULT
NSICU
Neurosurgery
NSICU
Neurosurgery
Intervent Radiology
NSICU
Neurosurgery

## 2024-11-06 NOTE — SPEECH LANGUAGE PATHOLOGY EVALUATION - SLP EXECUTIVE FUNCTION DEFICITS NOTED
?impulsivity due to attempts for ambulation despite nursing education for assist/supervision/impulsivity

## 2024-11-06 NOTE — DISCHARGE NOTE PROVIDER - NSDCCPCAREPLAN_GEN_ALL_CORE_FT
PRINCIPAL DISCHARGE DIAGNOSIS  Diagnosis: Subarachnoid hemorrhage  Assessment and Plan of Treatment:       SECONDARY DISCHARGE DIAGNOSES  Diagnosis: Hyperlipidemia  Assessment and Plan of Treatment:     Diagnosis: Hyponatremia  Assessment and Plan of Treatment:

## 2024-11-06 NOTE — PROGRESS NOTE ADULT - ASSESSMENT
63 year old female with acute spontaneous perimesencephalic SAH, non-aneurysmal.  Episodes of hypotension, suspected due to Nimodipine, orthostatic changes.  HLD. Mild hyponatremia.    Plan:  pt is being d/c'ed home   plan for outpt f/up in 2 weeks with DAE  d/c Nimodipine  pain control, PRN Tylenol  started on statin, cont  maintain Osats>92%, f/up with PCP early next week (Mon-Wed) with labs  instructed to return to ED with any changes in neurologic status, incl. increasing in intensity or frequency HAs, abnormal sensation, etc.

## 2024-11-06 NOTE — DISCHARGE NOTE PROVIDER - CARE PROVIDER_API CALL
Chava Pereyra  Neurosurgery  [address] [place] [id number]  Phone: [phone]  Fax: [phone]  Established Patient  Follow Up Time: 2 weeks

## 2024-11-06 NOTE — SPEECH LANGUAGE PATHOLOGY EVALUATION - SLP PERTINENT HISTORY OF CURRENT PROBLEM
63y old RHD Female w abrupt onset of abdomina pain/ headache/ nausea/ vomiting/ dizziness/ neck pain and weakness/ dizziness when upright at ~7PM yesterday after using a pulsating neck massager. Pt presented to ED at [facility] and CTB was obtained and significant for acute subarachnoid hemorrhage in the prepontine cistern and extending into the left perimesencephalic and ambient cisterns & pt was transferred to [facility] for further eval/ management.

## 2024-11-06 NOTE — DISCHARGE NOTE PROVIDER - NSDCACTIVITY_GEN_ALL_CORE
Return to Work/School allowed/Bathing allowed/Do not drive or operate machinery/Stairs allowed/Walking - Indoors allowed/No heavy lifting/straining/Activity as tolerated
Statement Selected

## 2024-11-06 NOTE — PHYSICAL THERAPY INITIAL EVALUATION ADULT - NSPTDISCHREC_GEN_A_CORE
Asking if magnesium can be in a prescription form. She takes it OTC and was taking 500 mg but wasn't able to get that dose so she is taking 400 mg . From what's she reads and sees on the health network , it is suggested to take 500 mg . Uses [facility] , may purchase it through this health channel if can not get in prescription form  
No skilled PT needs
No skilled PT needs

## 2024-11-06 NOTE — SPEECH LANGUAGE PATHOLOGY EVALUATION - SLP DIAGNOSIS
Primary language appears functional. Receptive/expressive language intact. No motor speech deficits identified despite slight L facial asymmetry noted. Cognitive function notable for successful mental manipulation, problem solving & short-term/immediate recall. Unsure if ?slight impulsivity noted as Pt observed with x 2 attempts for ambulation our of chair without assistance, despite nsg education.

## 2024-11-06 NOTE — PROGRESS NOTE ADULT - REASON FOR ADMISSION
Prepontine Subarachnoid aneurysm

## 2024-11-06 NOTE — PHYSICAL THERAPY INITIAL EVALUATION ADULT - ADDITIONAL COMMENTS
Pt lives in a private home with her spouse. No steps to navigate. Pt was independent PTA without an assist device. Pt owns no DME. Pt has a walk in shower with curtains, is right handed and wears glasses for reading.
Pt lives in a private home with her spouse. No steps to navigate. Pt was independent PTA without an assist device. Pt owns no DME.

## 2024-11-06 NOTE — PHYSICAL THERAPY INITIAL EVALUATION ADULT - SENSORY TESTS
(+) eye tracking bilaterally in all directions; (+) acuity in all fields; (+) finger to thumb coordination BUEs intact
no vision deficits reported

## 2024-11-06 NOTE — OCCUPATIONAL THERAPY INITIAL EVALUATION ADULT - DIAGNOSIS, OT EVAL
CVA
63 year old Female presents with headache, nausea, and vomiting after using a pulsating neck massager. CT head reveals acute subarachnoid hemorrhage in the prepontine cistern. Pt is s/p diagnostic angio with no vascular abnormalities noted. No immediate NSx intervention indicated at this time per neurosurgery. Pt re-evaluated in NSICU 2*2 pt c/o dizziness with RN while standing.

## 2024-11-06 NOTE — DISCHARGE NOTE PROVIDER - HOSPITAL COURSE
HPI:  63 year old female with no past medical history who presents with headache, nausea, and vomiting which began last night. Patient states that her symptoms began with abdominal pain for which she performed abdominal massage and she began experiencing strong headaches along with nausea and vomiting shortly after. Reports that she took over the counter Magnesium for her symptoms and went to bed. This morning, patient states that she continued to experience headache which worsened with standing and walking. Denies any current nausea, vomiting, or abdominal pain. Denies any episodes of vomiting today. Denies any changes to vision, difficulties speaking, or difficulty with moving the extremities. Notes that her headache improves with laying in bed.  (01 Nov 2024 17:21)      Hospital course:    11/1 CTH SAH in the prepontine cistern abd extending into the left perimesencephalic and ambient cistern   11/1 repeat CTH stable, CT c-spine neg for fx or dislocation LED neg, CTA neg   11/2 cerebral angiogram neg, Echo E 74%mitl TR   11/4&5 TCD nl     PHYSICAL EXAM:  General: No Acute Distress   Neurological: Awake, alert & oriented to person, place and time, Following Commands, PERRL, EOMI, Visual fields intact, Slight left nasolabial fold flattening, Speech Fluent, Moving all extremities   Muscle Strength: RUE: 5/5 LUE: 5/5 RLE: 5/5 LLE: 5/5  No Drift   Sensation Intact to Light Touch   Cerebellar: There is no dysmetria on finger to nose testing.  Gait : deferred  Pulmonary: non labored breathing, no use of accessory muscles  Cardiovascular:  Regular Rate and Rhythm   Gastrointestinal: Soft, Nontender, Nondistended         RADIOLOGY & ADDITIONAL TESTS:   CT Angio Head w/ IV Cont (11.01.24 @ 19:59) >  IMPRESSION:    1.  Stable perimesencephalic hemorrhage, without evidence of an   underlying vascular lesion.    < from: CT Angio Head w/ IV Cont (11.01.24 @ 19:59) >      IMPRESSION:    1.  Stable perimesencephalic hemorrhage, without evidence of an   underlying vascular lesion.  2.  Catheter angiography and neurosurgical consultation recommended.

## 2024-11-06 NOTE — DISCHARGE NOTE PROVIDER - NSDCFUADDINST_GEN_ALL_CORE_FT
Avoid using medications such as ibuprofen, motrin, or advil, as these can increase your risk for bleeding.

## 2024-11-06 NOTE — PROGRESS NOTE ADULT - SUBJECTIVE AND OBJECTIVE BOX
HPI:  63 year old female with no past medical history who presents with headache, nausea, and vomiting which began last night. Patient states that her symptoms began with abdominal pain for which she performed abdominal massage and she began experiencing strong headaches along with nausea and vomiting shortly after. Reports that she took over the counter Magnesium for her symptoms and went to bed. This morning, patient states that she continued to experience headache which worsened with standing and walking. Denies any current nausea, vomiting, or abdominal pain. Denies any episodes of vomiting today. Denies any changes to vision, difficulties speaking, or difficulty with moving the extremities. Notes that her headache improves with laying in bed.  (01 Nov 2024 17:21)    O/N EVENTS:  none reported.    ROS: reports episode of tingling in 2-4th fingers of R hand, resolved with local warm packs.  Otherwise, negative ROS.    -----------------------------------------------------------------------------------------------------------------------------------------------------------------------------------    ICU Vital Signs Last 24 Hrs  T(C): 36.6 (06 Nov 2024 11:23), Max: 36.9 (05 Nov 2024 23:36)  T(F): 97.9 (06 Nov 2024 11:23), Max: 98.5 (05 Nov 2024 23:36)  HR: 79 (06 Nov 2024 13:00) (58 - 104)  BP: 111/67 (06 Nov 2024 13:00) (91/58 - 140/77)  BP(mean): 81 (06 Nov 2024 13:00) (70 - 96)  ABP: --  ABP(mean): --  RR: 17 (06 Nov 2024 13:00) (8 - 26)  SpO2: 99% (06 Nov 2024 13:00) (93% - 100%)    O2 Parameters below as of 06 Nov 2024 13:00  Patient On (Oxygen Delivery Method): room air    -----------------------------------------------------------------------------------------------------------------------------------------------------------------------------------    PHYSICAL EXAM:  General: Calm, laying in bed    Neuro:  -Mental status- No acute distress, AOx3, conversational, following commands, comprehension intact.  -CN- PERRL 3mm, EOMI, tongue midline, face symmetric  -Motor- full strength in all ext  -Sensation- intact to LT   -Coordination- no dysmetria noted    CV: RRR  Pulm: Clear to auscultation  Abd: Soft, nontender, nondistended  Ext: No edema  Skin: warm, dry, fingertips normal color and temperature.  Peripheral pulses present.

## 2024-11-06 NOTE — SPEECH LANGUAGE PATHOLOGY EVALUATION - SLP GENERAL OBSERVATIONS
Pt recd awake/upright OOB to chair, A&A Ox3, reduced cognition, 0/10 pain pre/post, tolerating RA NAD, Turkish speaking RN Indy present & providing translation.

## 2024-11-06 NOTE — DISCHARGE NOTE PROVIDER - NSDCQMANTITHROMCONTRA_GEN_A_CORE
Problem: Risk for Maternal Complications  Goal: Remains free of signs and symptoms of infection  Outcome: Monitoring/Evaluating progress  Goal: Physical assessment maintained within expected parameters  Outcome: Monitoring/Evaluating progress  Goal: Remains free from falls  Outcome: Monitoring/Evaluating progress  Goal: Verbalizes understanding of fall prevention and safety devices  Description: Document on Patient Education Activity  Outcome: Monitoring/Evaluating progress     
Intracranial Hemorrhage

## 2024-11-06 NOTE — PROGRESS NOTE ADULT - SUBJECTIVE AND OBJECTIVE BOX
HPI:  63 year old female with no past medical history who presents with headache, nausea, and vomiting which began last night. Patient states that her symptoms began with abdominal pain for which she performed abdominal massage and she began experiencing strong headaches along with nausea and vomiting shortly after. Reports that she took over the counter Magnesium for her symptoms and went to bed. This morning, patient states that she continued to experience headache which worsened with standing and walking. Denies any current nausea, vomiting, or abdominal pain. Denies any episodes of vomiting today. Denies any changes to vision, difficulties speaking, or difficulty with moving the extremities. Notes that her headache improves with laying in bed.  (01 Nov 2024 17:21)    INTERVAL HPI/OVERNIGHT EVENTS:  63y Female s/p cerebral angiogram POD#4 seen lying comfortably in bed. Tolerating diet. Patient denies HA at time of exam. Neuro exam remains intact.     Vital Signs Last 24 Hrs  T(C): 36.9 (05 Nov 2024 23:36), Max: 36.9 (05 Nov 2024 23:36)  T(F): 98.5 (05 Nov 2024 23:36), Max: 98.5 (05 Nov 2024 23:36)  HR: 69 (05 Nov 2024 23:00) (64 - 89)  BP: 104/73 (05 Nov 2024 23:00) (93/49 - 140/93)  BP(mean): 82 (05 Nov 2024 23:00) (64 - 107)  RR: 19 (05 Nov 2024 23:00) (12 - 23)  SpO2: 97% (05 Nov 2024 23:00) (95% - 100%)    Parameters below as of 05 Nov 2024 19:00  Patient On (Oxygen Delivery Method): room air    PHYSICAL EXAM:  General: No Acute Distress     Neurological: Awake, alert & oriented to person, place and time, Following Commands, PERRL, EOMI, Visual fields intact, Slight left nasolabial fold flattening, Speech Fluent, Moving all extremities     Muscle Strength: RUE: 5/5 LUE: 5/5 RLE: 5/5 LLE: 5/5  No Drift     Sensation Intact to Light Touch     Cerebellar: There is no dysmetria on finger to nose testing.    Gait : deferred    Pulmonary: non labored breathing, no use of accessory muscles    Cardiovascular:  Regular Rate and Rhythm     Gastrointestinal: Soft, Nontender, Nondistended       LABS:                        12.6   5.94  )-----------( 318      ( 05 Nov 2024 02:15 )             36.1     11-05    135  |  101  |  12.5  ----------------------------<  98  4.1   |  21.0[L]  |  0.32[L]    Ca    8.6      05 Nov 2024 02:15  Phos  4.1     11-05  Mg     2.1     11-05        Urinalysis Basic - ( 05 Nov 2024 02:15 )    Color: x / Appearance: x / SG: x / pH: x  Gluc: 98 mg/dL / Ketone: x  / Bili: x / Urobili: x   Blood: x / Protein: x / Nitrite: x   Leuk Esterase: x / RBC: x / WBC x   Sq Epi: x / Non Sq Epi: x / Bacteria: x        11-04 @ 07:01 - 11-05 @ 07:00  --------------------------------------------------------  IN: 720 mL / OUT: 250 mL / NET: 470 mL    11-05 @ 07:01  -  11-06 @ 00:43  --------------------------------------------------------  IN: 598 mL / OUT: 1050 mL / NET: -452 mL        RADIOLOGY & ADDITIONAL TESTS:  < from: CT Angio Head w/ IV Cont (11.01.24 @ 19:59) >  IMPRESSION:    1.  Stable perimesencephalic hemorrhage, without evidence of an   underlying vascular lesion.

## 2024-11-06 NOTE — PROGRESS NOTE ADULT - ASSESSMENT
63y old RHD Female w abrupt onset of abdomina pain/ headache/ nausea/ vomiting/ dizziness/ neck pain and weakness/ dizziness when upright at ~7PM yesterday after using a pulsating neck massager.  Pt presented to ED at [facility] and CTB was obtained and significant for acute subarachnoid hemorrhage in the prepontine cistern and extending into the left perimesencephalic and ambient cisterns & pt was transferred to [facility] for further eval/ management.     Plan:   no immediate NSx intervention indicated at this time  CTA H/N performed and negative, pt with stable CTH. Angio negative for vascular lesion  neuro-checks q4 HR   HOB > 30 degrees  Nimodipine, statin  TCDs daily  OOB w assist/ fall precaution   SBP <160 as angio was negative  Maintain euvolemia   PT/OT  SQ lovenox and compression device for DVT ppx    Case to be discussed with Dr. Pereyra in the morning

## 2024-11-06 NOTE — OCCUPATIONAL THERAPY INITIAL EVALUATION ADULT - GENERAL OBSERVATIONS, REHAB EVAL
+tele,+IV
[place] phone  Francesca ID # [id number]. Left pt as received semifowler in bed, NAD, +IV lock, +Tele//BP oln RA. Pre/post pain 0/10, c/o numbness in R hand digits 1-3l; RN and team aware. Pt agreeable to OT evaluation

## 2024-11-06 NOTE — DISCHARGE NOTE PROVIDER - NSDCMRMEDTOKEN_GEN_ALL_CORE_FT
acetaminophen 325 mg oral tablet: 2 tab(s) orally every 6 hours As needed Moderate Pain (4 - 6)  atorvastatin 20 mg oral tablet: 1 tab(s) orally once a day (at bedtime)  cyclobenzaprine 5 mg oral tablet: 1 tab(s) orally 3 times a day   acetaminophen 325 mg oral tablet: 2 tab(s) orally every 6 hours As needed Moderate Pain (4 - 6)  atorvastatin 20 mg oral tablet: 1 tab(s) orally once a day (at bedtime) MDD: 1 tab  cyclobenzaprine 5 mg oral tablet: 1 tab(s) orally 3 times a day   acetaminophen 325 mg oral tablet: 2 tab(s) orally every 6 hours As needed Moderate Pain (4 - 6)  atorvastatin 20 mg oral tablet: 1 tab(s) orally once a day (at bedtime) MDD: 1 tab  cyclobenzaprine 5 mg oral tablet: 1 tab(s) orally 3 times a day MDD: 3 tab   acetaminophen 325 mg oral tablet: 2 tab(s) orally every 6 hours As needed Moderate Pain (4 - 6)  atorvastatin 20 mg oral tablet: 1 tab(s) orally once a day (at bedtime) MDD: 1 tab  cyclobenzaprine 5 mg oral tablet: 1 tab(s) orally 3 times a day MDD: 3 tab  MiraLax oral powder for reconstitution: 17 gram(s) orally once a day MDD: 17 gram  Senna 8.6 mg oral tablet: 2 tab(s) orally once a day (at bedtime) constipation

## 2024-11-06 NOTE — PHYSICAL THERAPY INITIAL EVALUATION ADULT - PERTINENT HX OF CURRENT PROBLEM, REHAB EVAL
63 year old female with no past medical history who presents with headache, nausea, and vomiting after using a pulsating neck massager. CT head reveals acute subarachnoid hemorrhage in the prepontine cistern. Pt is s/p diagnostic angio with no vascular abnormalities noted. No immediate NSx intervention indicated at this time per neurosurgery. Pt evaluated in NSICU.
63 year old female with no past medical history who presents with headache, nausea, and vomiting after using a pulsating neck massager. CT head reveals acute subarachnoid hemorrhage in the prepontine cistern. Pt is s/p diagnostic angio with no vascular abnormalities noted. No immediate NSx intervention indicated at this time per neurosurgery. Pt re-evaluated in NSICU 2*2 pt c/o dizziness with RN while standing.

## 2024-11-07 PROBLEM — Z78.9 OTHER SPECIFIED HEALTH STATUS: Chronic | Status: ACTIVE | Noted: 2024-11-01

## 2024-11-11 PROBLEM — Z00.00 ENCOUNTER FOR PREVENTIVE HEALTH EXAMINATION: Status: ACTIVE | Noted: 2024-11-11

## 2024-11-20 PROCEDURE — 85610 PROTHROMBIN TIME: CPT

## 2024-11-20 PROCEDURE — 86900 BLOOD TYPING SEROLOGIC ABO: CPT

## 2024-11-20 PROCEDURE — 71275 CT ANGIOGRAPHY CHEST: CPT | Mod: MC

## 2024-11-20 PROCEDURE — 36415 COLL VENOUS BLD VENIPUNCTURE: CPT

## 2024-11-20 PROCEDURE — 83605 ASSAY OF LACTIC ACID: CPT

## 2024-11-20 PROCEDURE — 99285 EMERGENCY DEPT VISIT HI MDM: CPT | Mod: 25

## 2024-11-20 PROCEDURE — 84484 ASSAY OF TROPONIN QUANT: CPT

## 2024-11-20 PROCEDURE — 96375 TX/PRO/DX INJ NEW DRUG ADDON: CPT | Mod: XU

## 2024-11-20 PROCEDURE — 87637 SARSCOV2&INF A&B&RSV AMP PRB: CPT

## 2024-11-20 PROCEDURE — 93005 ELECTROCARDIOGRAM TRACING: CPT

## 2024-11-20 PROCEDURE — 74174 CTA ABD&PLVS W/CONTRAST: CPT | Mod: MC

## 2024-11-20 PROCEDURE — 86850 RBC ANTIBODY SCREEN: CPT

## 2024-11-20 PROCEDURE — 72125 CT NECK SPINE W/O DYE: CPT | Mod: MC

## 2024-11-20 PROCEDURE — 70470 CT HEAD/BRAIN W/O & W/DYE: CPT | Mod: MC

## 2024-11-20 PROCEDURE — 71046 X-RAY EXAM CHEST 2 VIEWS: CPT

## 2024-11-20 PROCEDURE — 86901 BLOOD TYPING SEROLOGIC RH(D): CPT

## 2024-11-20 PROCEDURE — 80053 COMPREHEN METABOLIC PANEL: CPT

## 2024-11-20 PROCEDURE — 81003 URINALYSIS AUTO W/O SCOPE: CPT

## 2024-11-20 PROCEDURE — 85025 COMPLETE CBC W/AUTO DIFF WBC: CPT

## 2024-11-20 PROCEDURE — T1013: CPT

## 2024-11-20 PROCEDURE — 85730 THROMBOPLASTIN TIME PARTIAL: CPT

## 2024-11-20 PROCEDURE — 96374 THER/PROPH/DIAG INJ IV PUSH: CPT | Mod: XU

## 2024-11-22 ENCOUNTER — APPOINTMENT (OUTPATIENT)
Dept: NEUROSURGERY | Facility: CLINIC | Age: 64
End: 2024-11-22
Payer: MEDICARE

## 2024-11-22 VITALS
HEART RATE: 76 BPM | SYSTOLIC BLOOD PRESSURE: 124 MMHG | BODY MASS INDEX: 24.19 KG/M2 | HEIGHT: 59 IN | OXYGEN SATURATION: 98 % | TEMPERATURE: 99.1 F | DIASTOLIC BLOOD PRESSURE: 83 MMHG | WEIGHT: 120 LBS

## 2024-11-22 DIAGNOSIS — I60.8 OTHER NONTRAUMATIC SUBARACHNOID HEMORRHAGE: ICD-10-CM

## 2024-11-22 PROCEDURE — 99213 OFFICE O/P EST LOW 20 MIN: CPT

## 2024-11-26 PROCEDURE — C1894: CPT

## 2024-11-26 PROCEDURE — 36415 COLL VENOUS BLD VENIPUNCTURE: CPT

## 2024-11-26 PROCEDURE — 86901 BLOOD TYPING SEROLOGIC RH(D): CPT

## 2024-11-26 PROCEDURE — T1013: CPT

## 2024-11-26 PROCEDURE — 36226 PLACE CATH VERTEBRAL ART: CPT

## 2024-11-26 PROCEDURE — 93886 INTRACRANIAL COMPLETE STUDY: CPT

## 2024-11-26 PROCEDURE — 70498 CT ANGIOGRAPHY NECK: CPT | Mod: MC

## 2024-11-26 PROCEDURE — 80048 BASIC METABOLIC PNL TOTAL CA: CPT

## 2024-11-26 PROCEDURE — 87640 STAPH A DNA AMP PROBE: CPT

## 2024-11-26 PROCEDURE — 93005 ELECTROCARDIOGRAM TRACING: CPT

## 2024-11-26 PROCEDURE — 93970 EXTREMITY STUDY: CPT

## 2024-11-26 PROCEDURE — 83735 ASSAY OF MAGNESIUM: CPT

## 2024-11-26 PROCEDURE — 85730 THROMBOPLASTIN TIME PARTIAL: CPT

## 2024-11-26 PROCEDURE — 83036 HEMOGLOBIN GLYCOSYLATED A1C: CPT

## 2024-11-26 PROCEDURE — 86850 RBC ANTIBODY SCREEN: CPT

## 2024-11-26 PROCEDURE — 70496 CT ANGIOGRAPHY HEAD: CPT | Mod: MC

## 2024-11-26 PROCEDURE — 36227 PLACE CATH XTRNL CAROTID: CPT

## 2024-11-26 PROCEDURE — 82962 GLUCOSE BLOOD TEST: CPT

## 2024-11-26 PROCEDURE — 36224 PLACE CATH CAROTD ART: CPT

## 2024-11-26 PROCEDURE — 84443 ASSAY THYROID STIM HORMONE: CPT

## 2024-11-26 PROCEDURE — 80061 LIPID PANEL: CPT

## 2024-11-26 PROCEDURE — 85610 PROTHROMBIN TIME: CPT

## 2024-11-26 PROCEDURE — 84100 ASSAY OF PHOSPHORUS: CPT

## 2024-11-26 PROCEDURE — 80053 COMPREHEN METABOLIC PANEL: CPT

## 2024-11-26 PROCEDURE — 93306 TTE W/DOPPLER COMPLETE: CPT

## 2024-11-26 PROCEDURE — C1887: CPT

## 2024-11-26 PROCEDURE — 97163 PT EVAL HIGH COMPLEX 45 MIN: CPT

## 2024-11-26 PROCEDURE — C1769: CPT

## 2024-11-26 PROCEDURE — 97167 OT EVAL HIGH COMPLEX 60 MIN: CPT

## 2024-11-26 PROCEDURE — 86900 BLOOD TYPING SEROLOGIC ABO: CPT

## 2024-11-26 PROCEDURE — 85027 COMPLETE CBC AUTOMATED: CPT

## 2024-11-26 PROCEDURE — 87641 MR-STAPH DNA AMP PROBE: CPT

## 2024-11-26 PROCEDURE — 70450 CT HEAD/BRAIN W/O DYE: CPT | Mod: MC

## 2024-11-26 PROCEDURE — 99291 CRITICAL CARE FIRST HOUR: CPT

## 2024-12-02 ENCOUNTER — OUTPATIENT (OUTPATIENT)
Dept: OUTPATIENT SERVICES | Facility: HOSPITAL | Age: 64
LOS: 1 days | End: 2024-12-02
Payer: MEDICARE

## 2024-12-02 ENCOUNTER — APPOINTMENT (OUTPATIENT)
Dept: CT IMAGING | Facility: CLINIC | Age: 64
End: 2024-12-02
Payer: MEDICARE

## 2024-12-02 DIAGNOSIS — Z00.8 ENCOUNTER FOR OTHER GENERAL EXAMINATION: ICD-10-CM

## 2024-12-02 PROCEDURE — 70450 CT HEAD/BRAIN W/O DYE: CPT | Mod: 26,MH

## 2024-12-02 PROCEDURE — 70450 CT HEAD/BRAIN W/O DYE: CPT

## 2024-12-03 ENCOUNTER — APPOINTMENT (OUTPATIENT)
Dept: NEUROLOGY | Facility: CLINIC | Age: 64
End: 2024-12-03
Payer: MEDICARE

## 2024-12-03 VITALS
SYSTOLIC BLOOD PRESSURE: 132 MMHG | WEIGHT: 120 LBS | HEIGHT: 59 IN | BODY MASS INDEX: 24.19 KG/M2 | OXYGEN SATURATION: 98 % | HEART RATE: 75 BPM | DIASTOLIC BLOOD PRESSURE: 58 MMHG

## 2024-12-03 DIAGNOSIS — I60.8 OTHER NONTRAUMATIC SUBARACHNOID HEMORRHAGE: ICD-10-CM

## 2024-12-03 DIAGNOSIS — Z78.9 OTHER SPECIFIED HEALTH STATUS: ICD-10-CM

## 2024-12-03 PROCEDURE — 99215 OFFICE O/P EST HI 40 MIN: CPT

## 2024-12-03 PROCEDURE — G2211 COMPLEX E/M VISIT ADD ON: CPT

## 2024-12-06 ENCOUNTER — APPOINTMENT (OUTPATIENT)
Dept: NEUROSURGERY | Facility: CLINIC | Age: 64
End: 2024-12-06
Payer: MEDICARE

## 2024-12-06 VITALS
SYSTOLIC BLOOD PRESSURE: 130 MMHG | DIASTOLIC BLOOD PRESSURE: 83 MMHG | HEART RATE: 66 BPM | HEIGHT: 59 IN | TEMPERATURE: 97.4 F | BODY MASS INDEX: 25.4 KG/M2 | WEIGHT: 126 LBS | OXYGEN SATURATION: 97 %

## 2024-12-06 PROCEDURE — 99213 OFFICE O/P EST LOW 20 MIN: CPT

## 2025-03-28 ENCOUNTER — APPOINTMENT (OUTPATIENT)
Dept: MRI IMAGING | Facility: CLINIC | Age: 65
End: 2025-03-28
Payer: MEDICARE

## 2025-03-28 PROCEDURE — A9585: CPT | Mod: JW

## 2025-03-28 PROCEDURE — 70553 MRI BRAIN STEM W/O & W/DYE: CPT

## 2025-04-08 ENCOUNTER — APPOINTMENT (OUTPATIENT)
Dept: NEUROLOGY | Facility: CLINIC | Age: 65
End: 2025-04-08
Payer: MEDICARE

## 2025-04-08 VITALS
BODY MASS INDEX: 25.78 KG/M2 | DIASTOLIC BLOOD PRESSURE: 70 MMHG | HEIGHT: 59 IN | HEART RATE: 69 BPM | OXYGEN SATURATION: 98 % | WEIGHT: 127.87 LBS | SYSTOLIC BLOOD PRESSURE: 142 MMHG

## 2025-04-08 DIAGNOSIS — I60.8 OTHER NONTRAUMATIC SUBARACHNOID HEMORRHAGE: ICD-10-CM

## 2025-04-08 DIAGNOSIS — H93.19 TINNITUS, UNSPECIFIED EAR: ICD-10-CM

## 2025-04-08 PROCEDURE — 99215 OFFICE O/P EST HI 40 MIN: CPT

## 2025-04-18 ENCOUNTER — APPOINTMENT (OUTPATIENT)
Dept: OTOLARYNGOLOGY | Facility: CLINIC | Age: 65
End: 2025-04-18

## 2025-08-27 ENCOUNTER — APPOINTMENT (OUTPATIENT)
Dept: GERIATRICS | Facility: CLINIC | Age: 65
End: 2025-08-27